# Patient Record
Sex: FEMALE | Race: WHITE | NOT HISPANIC OR LATINO | Employment: FULL TIME | URBAN - METROPOLITAN AREA
[De-identification: names, ages, dates, MRNs, and addresses within clinical notes are randomized per-mention and may not be internally consistent; named-entity substitution may affect disease eponyms.]

---

## 2018-06-14 ENCOUNTER — TRANSCRIBE ORDERS (OUTPATIENT)
Dept: DIABETES SERVICES | Facility: HOSPITAL | Age: 53
End: 2018-06-14

## 2018-06-14 DIAGNOSIS — E11.21 DIABETIC GLOMERULOPATHY (HCC): Primary | ICD-10-CM

## 2018-06-14 DIAGNOSIS — E11.8 UNCONTROLLED TYPE 2 DIABETES MELLITUS WITH COMPLICATION, UNSPECIFIED LONG TERM INSULIN USE STATUS: ICD-10-CM

## 2018-06-14 DIAGNOSIS — E11.65 UNCONTROLLED TYPE 2 DIABETES MELLITUS WITH COMPLICATION, UNSPECIFIED LONG TERM INSULIN USE STATUS: ICD-10-CM

## 2018-06-21 ENCOUNTER — HOSPITAL ENCOUNTER (OUTPATIENT)
Dept: DIABETES SERVICES | Facility: HOSPITAL | Age: 53
Setting detail: RECURRING SERIES
Discharge: HOME | End: 2018-06-21

## 2018-06-21 PROCEDURE — G0109 DIAB MANAGE TRN IND/GROUP: HCPCS | Performed by: DIETITIAN, REGISTERED

## 2019-05-10 ENCOUNTER — CONSULT (OUTPATIENT)
Dept: SLEEP MEDICINE | Facility: HOSPITAL | Age: 54
End: 2019-05-10

## 2019-05-10 VITALS
SYSTOLIC BLOOD PRESSURE: 120 MMHG | HEIGHT: 67 IN | OXYGEN SATURATION: 96 % | DIASTOLIC BLOOD PRESSURE: 85 MMHG | HEART RATE: 80 BPM | WEIGHT: 226.4 LBS | BODY MASS INDEX: 35.53 KG/M2

## 2019-05-10 DIAGNOSIS — R06.83 SNORING: Primary | ICD-10-CM

## 2019-05-10 DIAGNOSIS — E66.01 CLASS 2 SEVERE OBESITY DUE TO EXCESS CALORIES WITH SERIOUS COMORBIDITY AND BODY MASS INDEX (BMI) OF 35.0 TO 35.9 IN ADULT (HCC): ICD-10-CM

## 2019-05-10 DIAGNOSIS — G47.33 OBSTRUCTIVE SLEEP APNEA, ADULT: ICD-10-CM

## 2019-05-10 PROBLEM — E66.812 CLASS 2 SEVERE OBESITY DUE TO EXCESS CALORIES WITH SERIOUS COMORBIDITY AND BODY MASS INDEX (BMI) OF 35.0 TO 35.9 IN ADULT: Status: ACTIVE | Noted: 2019-05-10

## 2019-05-10 PROCEDURE — 99203 OFFICE O/P NEW LOW 30 MIN: CPT | Performed by: INTERNAL MEDICINE

## 2019-05-10 RX ORDER — CETIRIZINE HYDROCHLORIDE 5 MG/1
5 TABLET ORAL
COMMUNITY
Start: 2019-03-11 | End: 2022-06-17

## 2019-05-10 RX ORDER — GABAPENTIN 400 MG/1
CAPSULE ORAL
Refills: 0 | COMMUNITY
Start: 2019-05-01 | End: 2022-06-17

## 2019-05-10 RX ORDER — LOSARTAN POTASSIUM 50 MG/1
TABLET ORAL
COMMUNITY
Start: 2019-03-11 | End: 2021-04-13

## 2019-05-10 RX ORDER — LAMOTRIGINE 200 MG/1
TABLET ORAL
COMMUNITY
Start: 2019-03-11

## 2019-05-10 RX ORDER — LACTULOSE 10 G/15ML
SOLUTION ORAL
Refills: 0 | COMMUNITY
Start: 2019-03-01 | End: 2019-06-05

## 2019-05-10 RX ORDER — DULOXETIN HYDROCHLORIDE 60 MG/1
CAPSULE, DELAYED RELEASE ORAL
COMMUNITY
Start: 2019-04-04 | End: 2019-11-27

## 2019-05-10 RX ORDER — DOCUSATE SODIUM 250 MG/1
CAPSULE, LIQUID FILLED ORAL
Refills: 0 | COMMUNITY
Start: 2019-03-04 | End: 2019-06-05

## 2019-05-10 RX ORDER — DULOXETIN HYDROCHLORIDE 30 MG/1
CAPSULE, DELAYED RELEASE ORAL
COMMUNITY
Start: 2019-04-04 | End: 2019-11-27

## 2019-05-10 RX ORDER — OMEPRAZOLE 40 MG/1
CAPSULE, DELAYED RELEASE ORAL
COMMUNITY
Start: 2019-03-11

## 2019-05-10 RX ORDER — GLUCOSAM/CHON-MSM1/C/MANG/BOSW 500-416.6
TABLET ORAL 4 TIMES DAILY
Refills: 1 | COMMUNITY
Start: 2019-05-03

## 2019-05-10 RX ORDER — CLONIDINE HYDROCHLORIDE 0.1 MG/1
TABLET ORAL
Refills: 0 | COMMUNITY
Start: 2019-04-10

## 2019-05-10 RX ORDER — FLUTICASONE PROPIONATE 50 MCG
SPRAY, SUSPENSION (ML) NASAL
COMMUNITY
Start: 2019-02-18

## 2019-05-10 RX ORDER — RISPERIDONE 2 MG/1
TABLET ORAL
Refills: 0 | COMMUNITY
Start: 2019-03-28 | End: 2022-09-17

## 2019-05-10 RX ORDER — PRAVASTATIN SODIUM 40 MG
TABLET ORAL
COMMUNITY
Start: 2019-03-30

## 2019-05-10 RX ORDER — LEVOTHYROXINE SODIUM 0.03 MG/1
TABLET ORAL
COMMUNITY
Start: 2019-03-11

## 2019-05-10 RX ORDER — GLIPIZIDE 5 MG/1
TABLET, FILM COATED, EXTENDED RELEASE ORAL
Refills: 0 | COMMUNITY
Start: 2019-05-07 | End: 2022-01-30

## 2019-05-14 NOTE — PROGRESS NOTES
Subjective   Colette Hatch is a 53 y.o. female is being seen for consultation today at the request of ELVA Joy for the evaluation of snoring and nonrestorative sleep.    History of Present Illness  Patient complains of always feeling tired in the morning.  She says she often awakens between 4 and 6 AM.  She gets sleepy driving.  She has run on the [place] strips.  She has been told she snores loudly.  She is been noted to have snoring for 15 years.  She denies being told she has apneas.  She had a previous sleep study in was placed on CPAP but she could not leave it on.  She says she is gained probably 30 pounds since then.  She will fall asleep if sitting quietly during the day.  She naps almost daily for about 2 hours.    She has loud snoring snores in all positions.  She is awakened with a dry mouth.  She has trouble breathing through her nose during the day.  She denies ever breaking her nose.  She does not feel rested in the morning.  She has a morning headache 3 days/week.  She has a history of reflux and is on medications.  She is rarely experience hypnagogic hallucinations.  She denies any sleep paralysis or cataplexy.  She denies kicking or jerking her legs at night.  She occasionally has pain in her back.    She goes to bed about 10 PM.  She will fall asleep in about 15 minutes.  She awakens twice during the night.  She thinks she gets about 6 hours of sleep and says she usually feels tired on arising.  She has had hypertension known for 20 years.  She has had diabetes known for 2 years.  She is been hypothyroid for roughly 30 years.  She has been told she had schizoaffective disorder.  Allergies   Allergen Reactions   • Penicillins Rash   She is also allergic to pollen, animals, and dust.      Current Outpatient Medications:   •  Blood Glucose Monitoring Suppl (TRUE METRIX METER) w/Device kit, 4 (Four) Times a Day. as directed, Disp: , Rfl: 0  •  cetirizine (zyrTEC) 10 MG tablet, , Disp: , Rfl:    •  CloNIDine (CATAPRES) 0.1 MG tablet, take 1 tablet by mouth once daily ...MAY ADD A SECOND DOSE IF NEEDED FOR SYMPTOM MANAGEMENT, Disp: , Rfl: 0  •  DULoxetine (CYMBALTA) 30 MG capsule, , Disp: , Rfl:   •  DULoxetine (CYMBALTA) 60 MG capsule, , Disp: , Rfl:   •  fluticasone (FLONASE) 50 MCG/ACT nasal spray, , Disp: , Rfl:   •  gabapentin (NEURONTIN) 300 MG capsule, take 1 capsule by mouth four times a day IN THE MORNING, LUNCH, A...  (REFER TO PRESCRIPTION NOTES)., Disp: , Rfl: 0  •  glipiZIDE (GLUCOTROL XL) 2.5 MG 24 hr tablet, take 1 tablet by mouth once daily with LARGEST MEAL, Disp: , Rfl: 0  •  lactulose (CHRONULAC) 10 GM/15ML solution, take 15 milliliters by mouth once daily if needed for constipation may repeat in 2 hours, Disp: , Rfl: 0  •  lamoTRIgine (LaMICtal) 200 MG tablet, , Disp: , Rfl:   •  levothyroxine (SYNTHROID, LEVOTHROID) 25 MCG tablet, , Disp: , Rfl:   •  losartan (COZAAR) 50 MG tablet, , Disp: , Rfl:   •  metFORMIN (GLUCOPHAGE) 500 MG tablet, Take 500 mg by mouth 2 (Two) Times a Day With Meals., Disp: , Rfl: 0  •  omeprazole (priLOSEC) 40 MG capsule, , Disp: , Rfl:   •  pravastatin (PRAVACHOL) 40 MG tablet, , Disp: , Rfl:   •  RA COL-RITE 250 MG capsule, , Disp: , Rfl: 0  •  risperiDONE (risperDAL) 2 MG tablet, take 1/2 tablet by mouth four times a day, Disp: , Rfl: 0  •  TRUEPLUS LANCETS 33G misc, 4 (Four) Times a Day. as directed, Disp: , Rfl: 1    Social History    Tobacco Use      Smoking status: Former Smoker she estimates smoking a pack per day for 16 years.        Packs/day: 1.00        Types: Cigarettes        Quit date:         Years since quittin.3      Smokeless tobacco: Never Used       Social History     Substance and Sexual Activity   Alcohol Use No   • Frequency: Never       Caffeine: She has 3 cups of coffee and 2 servings of tea each day    Past Medical History:   Diagnosis Date   • Arthritis    • Diabetes mellitus (CMS/HCC)    • Disease of thyroid gland    •  "Hypertension    • Mental disorder        History reviewed. No pertinent surgical history.    Family History   Problem Relation Age of Onset   • Hypertension Mother    • Obesity Mother    • Diabetes Father    • Hypertension Father    • Obesity Father    • Obesity Sister    • Hypersomnolence Sister    • Obesity Brother    • Sleep apnea Brother    • Heart disease Maternal Aunt    • Narcolepsy Maternal Uncle        The following portions of the patient's history were reviewed and updated as appropriate: allergies, current medications, past family history, past medical history, past social history, past surgical history and problem list.    Review of Systems   Constitutional: Positive for fatigue.   HENT: Positive for tinnitus.    Eyes: Negative.    Respiratory: Negative.    Cardiovascular: Negative.    Gastrointestinal: Positive for constipation.        She complains of change in appetite   Endocrine: Positive for cold intolerance, heat intolerance, polydipsia and polyuria.   Genitourinary: Positive for frequency.   Musculoskeletal: Positive for arthralgias, joint swelling and myalgias.   Skin: Negative.    Allergic/Immunologic: Positive for environmental allergies.   Neurological: Positive for dizziness, light-headedness and numbness.   Hematological: Negative.    Psychiatric/Behavioral: Positive for confusion, decreased concentration and dysphoric mood. The patient is nervous/anxious.    [place] score is 21/24    Objective     /85   Pulse 80   Ht 170.2 cm (67\")   Wt 103 kg (226 lb 6.4 oz)   SpO2 96%   BMI 35.46 kg/m²      Physical Exam   Constitutional: She is oriented to person, place, and time. She appears well-developed and well-nourished.   She is obese.   HENT:   Head: Normocephalic and atraumatic.   She has Mallampati class IV anatomy.   Eyes: EOM are normal. Pupils are equal, round, and reactive to light.   Neck: Normal range of motion. Neck supple.   Cardiovascular: Normal rate, regular rhythm and " normal heart sounds.   Pulmonary/Chest: Effort normal and breath sounds normal.   Abdominal: Soft. Bowel sounds are normal.   Musculoskeletal: Normal range of motion. She exhibits no edema.   Neurological: She is alert and oriented to person, place, and time.   Skin: Skin is warm and dry.   Psychiatric: She has a normal mood and affect. Her behavior is normal.         Assessment/Plan   Colette was seen today for sleeping problem.    Diagnoses and all orders for this visit:    Snoring  -     Home Sleep Study; Future    Obstructive sleep apnea, adult  -     Home Sleep Study; Future    Class 2 severe obesity due to excess calories with serious comorbidity and body mass index (BMI) of 35.0 to 35.9 in adult (CMS/[facility])    Patient is a history of snoring nonrestorative sleep.  I think she has an excellent story for obstructive sleep apnea.  We will plan to proceed to home sleep testing.  I discussed possible therapies including CPAP, weight control, oral appliance, and surgery.  We have also discussed the long-term consequences of untreated obstructive sleep apnea.  She is to return in after study.  She is encouraged to lose weight.  She is encouraged to avoid alcohol and sedatives close to bedtime.  She is encouraged to practice lateral position sleep         Gaudencio Miller MD [facility]  Sleep Medicine  Pulmonary and Critical Care Medicine

## 2019-06-05 ENCOUNTER — HOSPITAL ENCOUNTER (OUTPATIENT)
Dept: SLEEP MEDICINE | Facility: HOSPITAL | Age: 54
Discharge: HOME | End: 2019-06-05
Admitting: INTERNAL MEDICINE

## 2019-06-05 VITALS
WEIGHT: 226.6 LBS | DIASTOLIC BLOOD PRESSURE: 85 MMHG | OXYGEN SATURATION: 96 % | TEMPERATURE: 98.6 F | HEIGHT: 67 IN | SYSTOLIC BLOOD PRESSURE: 157 MMHG | HEART RATE: 76 BPM | BODY MASS INDEX: 35.56 KG/M2 | RESPIRATION RATE: 18 BRPM

## 2019-06-05 DIAGNOSIS — G47.33 OBSTRUCTIVE SLEEP APNEA, ADULT: ICD-10-CM

## 2019-06-05 DIAGNOSIS — R06.83 SNORING: ICD-10-CM

## 2019-06-05 PROCEDURE — 95806 SLEEP STUDY UNATT&RESP EFFT: CPT | Performed by: INTERNAL MEDICINE

## 2019-06-05 PROCEDURE — 95806 SLEEP STUDY UNATT&RESP EFFT: CPT

## 2019-06-06 DIAGNOSIS — R06.83 SNORING: ICD-10-CM

## 2019-06-06 DIAGNOSIS — E66.01 CLASS 2 SEVERE OBESITY DUE TO EXCESS CALORIES WITH SERIOUS COMORBIDITY AND BODY MASS INDEX (BMI) OF 35.0 TO 35.9 IN ADULT (HCC): ICD-10-CM

## 2019-06-06 DIAGNOSIS — G47.33 OBSTRUCTIVE SLEEP APNEA, ADULT: Primary | ICD-10-CM

## 2019-06-13 ENCOUNTER — OFFICE VISIT (OUTPATIENT)
Dept: SLEEP MEDICINE | Facility: HOSPITAL | Age: 54
End: 2019-06-13

## 2019-06-13 VITALS
WEIGHT: 226.4 LBS | HEART RATE: 76 BPM | BODY MASS INDEX: 35.53 KG/M2 | DIASTOLIC BLOOD PRESSURE: 80 MMHG | SYSTOLIC BLOOD PRESSURE: 133 MMHG | OXYGEN SATURATION: 96 % | HEIGHT: 67 IN

## 2019-06-13 DIAGNOSIS — G47.33 OSA (OBSTRUCTIVE SLEEP APNEA): Primary | ICD-10-CM

## 2019-06-13 DIAGNOSIS — E66.01 CLASS 2 SEVERE OBESITY DUE TO EXCESS CALORIES WITH SERIOUS COMORBIDITY AND BODY MASS INDEX (BMI) OF 35.0 TO 35.9 IN ADULT (HCC): ICD-10-CM

## 2019-06-13 PROCEDURE — 99213 OFFICE O/P EST LOW 20 MIN: CPT | Performed by: NURSE PRACTITIONER

## 2019-06-13 NOTE — PROGRESS NOTES
Subjective: Follow-up        Chief Complaint:   Chief Complaint   Patient presents with   • Follow-up       HPI:    Colette Hatch is a 53 y.o. female here for follow-up of study results.  Patient was seen here in consult 5/10/2019 by Dr. Gaudencio Miller.  Patient is having excessive daytime sleepiness, drowsy driving, and snoring.  Patient in the past was diagnosed with sleep apnea but only wore CPAP for approximately 2 months.  Patient states this was uncomfortable and did complain of leaks.  Patient had a sleep study on 6/5/2019 that did show mild obstructive sleep apnea.  At this time she is sleeping 6 hours nightly and does not feel refreshed upon awakening.  Patient has an [place] score of 21/24.  Patient understands consequences of untreated sleep apnea as well as therapies available to her.  Patient wishes to initiate CPAP therapy.        Current medications are:   Current Outpatient Medications:   •  Blood Glucose Monitoring Suppl (TRUE METRIX METER) w/Device kit, 4 (Four) Times a Day. as directed, Disp: , Rfl: 0  •  cetirizine (zyrTEC) 5 MG tablet, 5 mg., Disp: , Rfl:   •  CloNIDine (CATAPRES) 0.1 MG tablet, take 1 tablet by mouth once daily ...MAY ADD A SECOND DOSE IF NEEDED FOR SYMPTOM MANAGEMENT, Disp: , Rfl: 0  •  DULoxetine (CYMBALTA) 30 MG capsule, , Disp: , Rfl:   •  DULoxetine (CYMBALTA) 60 MG capsule, , Disp: , Rfl:   •  fluticasone (FLONASE) 50 MCG/ACT nasal spray, , Disp: , Rfl:   •  gabapentin (NEURONTIN) 300 MG capsule, take 1 capsule by mouth four times a day IN THE MORNING, LUNCH, A...  (REFER TO PRESCRIPTION NOTES)., Disp: , Rfl: 0  •  glipiZIDE (GLUCOTROL XL) 5 MG ER tablet, take 1 tablet by mouth once daily with LARGEST MEAL, Disp: , Rfl: 0  •  lamoTRIgine (LaMICtal) 200 MG tablet, , Disp: , Rfl:   •  levothyroxine (SYNTHROID, LEVOTHROID) 25 MCG tablet, , Disp: , Rfl:   •  losartan (COZAAR) 50 MG tablet, , Disp: , Rfl:   •  metFORMIN (GLUCOPHAGE) 500 MG tablet, Take 500 mg by mouth 2 (Two) Times  a Day With Meals., Disp: , Rfl: 0  •  omeprazole (priLOSEC) 40 MG capsule, , Disp: , Rfl:   •  pravastatin (PRAVACHOL) 40 MG tablet, , Disp: , Rfl:   •  risperiDONE (risperDAL) 2 MG tablet, take 1/2 tablet by mouth four times a day, Disp: , Rfl: 0  •  TRUEPLUS LANCETS 33G misc, 4 (Four) Times a Day. as directed, Disp: , Rfl: 1.      The patient's relevant past medical, surgical, family and social history were reviewed and updated in Epic as appropriate.       Review of Systems   Constitutional: Positive for fatigue.   HENT: Positive for tinnitus.    Eyes: Positive for visual disturbance.   Respiratory: Positive for apnea.    Gastrointestinal: Positive for constipation.   Endocrine: Positive for cold intolerance, heat intolerance, polydipsia and polyuria.   Genitourinary: Positive for frequency.   Musculoskeletal: Positive for arthralgias and myalgias.   Allergic/Immunologic: Positive for environmental allergies.   Neurological: Positive for dizziness, light-headedness and numbness.   Psychiatric/Behavioral: Positive for confusion, decreased concentration, dysphoric mood and sleep disturbance. The patient is nervous/anxious.    All other systems reviewed and are negative.        Objective:    Physical Exam   Constitutional: She is oriented to person, place, and time. She appears well-developed and well-nourished.   HENT:   Head: Normocephalic and atraumatic.   Mouth/Throat: Oropharynx is clear and moist.   Class 4 airway   Eyes: Conjunctivae are normal.   Neck: Neck supple. No thyromegaly present.   Cardiovascular: Normal rate and regular rhythm.   Pulmonary/Chest: Effort normal and breath sounds normal.   Lymphadenopathy:     She has no cervical adenopathy.   Neurological: She is alert and oriented to person, place, and time.   Skin: Skin is warm and dry.   Psychiatric: She has a normal mood and affect. Her behavior is normal. Judgment and thought content normal.   Nursing note and vitals  reviewed.        ASSESSMENT/PLAN    Colette was seen today for follow-up.    Diagnoses and all orders for this visit:    FREDA (obstructive sleep apnea)    Class 2 severe obesity due to excess calories with serious comorbidity and body mass index (BMI) of 35.0 to 35.9 in adult (CMS/[facility])            1. Counseled patient regarding multimodal approach with healthy nutrition, healthy sleep, regular physical activity, social activities, counseling, and medications. Encouraged to practice lateral sleep position. Avoid alcohol and sedatives close to bedtime.  2.   Order to initiate CPAP therapy faxed to DME of patient's choosing.  I will see patient back in 31 to 90 days.  I have reviewed the results of my evaluation and impression and discussed my recommendations in detail with the patient.      Signed by  ELVA Benítez    June 13, 2019      CC: Merle Charlton APRN          No ref. provider found

## 2019-06-13 NOTE — PATIENT INSTRUCTIONS

## 2019-08-15 ENCOUNTER — OFFICE VISIT (OUTPATIENT)
Dept: SLEEP MEDICINE | Facility: HOSPITAL | Age: 54
End: 2019-08-15

## 2019-08-15 VITALS
TEMPERATURE: 97.1 F | RESPIRATION RATE: 18 BRPM | BODY MASS INDEX: 35 KG/M2 | SYSTOLIC BLOOD PRESSURE: 109 MMHG | WEIGHT: 223 LBS | HEIGHT: 67 IN | OXYGEN SATURATION: 96 % | DIASTOLIC BLOOD PRESSURE: 65 MMHG | HEART RATE: 77 BPM

## 2019-08-15 DIAGNOSIS — G47.33 OSA (OBSTRUCTIVE SLEEP APNEA): Primary | ICD-10-CM

## 2019-08-15 PROCEDURE — 99212 OFFICE O/P EST SF 10 MIN: CPT | Performed by: NURSE PRACTITIONER

## 2019-08-15 NOTE — PROGRESS NOTES
Chief Complaint:   Chief Complaint   Patient presents with   • Follow-up       HPI:  *    Colette Hatch is a 53 y.o. female here for compliance after starting CPAP. Patient was seen here in consult 5/10/2019 by Dr. Gaudencio Miller.  Patient was having excessive daytime sleepiness, drowsy driving, and snoring.  Patient in the past was diagnosed with sleep apnea but only wore CPAP for approximately 2 months.  Patient states this was uncomfortable and did complain of leaks.  Patient had a sleep study on 6/5/2019 that did show mild obstructive sleep apnea.  Patient decided to initiate CPAP therapy on her last appointment of 6/13/2019.  Vision states when she wears her CPAP she feels she does really well and feels rested upon awakening and throughout the day.  Patient has an [place] score of 4/24.  Patient states her main problem has been laying down in the bed before placing on her mask and she does fall asleep before she gets a chance to do this.  Patient has also been staying up late with her  and does know she needs to have a better nighttime routine.  She uses a nasal mask due to her preference but does still experience some leaks.  Patient would like to continue CPAP to see if she can become more compliant.             Current medications are:   Current Outpatient Medications:   •  Blood Glucose Monitoring Suppl (TRUE METRIX METER) w/Device kit, 4 (Four) Times a Day. as directed, Disp: , Rfl: 0  •  cetirizine (zyrTEC) 5 MG tablet, 5 mg., Disp: , Rfl:   •  CloNIDine (CATAPRES) 0.1 MG tablet, take 1 tablet by mouth once daily ...MAY ADD A SECOND DOSE IF NEEDED FOR SYMPTOM MANAGEMENT, Disp: , Rfl: 0  •  DULoxetine (CYMBALTA) 30 MG capsule, , Disp: , Rfl:   •  DULoxetine (CYMBALTA) 60 MG capsule, , Disp: , Rfl:   •  fluticasone (FLONASE) 50 MCG/ACT nasal spray, , Disp: , Rfl:   •  gabapentin (NEURONTIN) 300 MG capsule, take 1 capsule by mouth four times a day IN THE MORNING, LUNCH, A...  (REFER TO PRESCRIPTION  NOTES)., Disp: , Rfl: 0  •  glipiZIDE (GLUCOTROL XL) 5 MG ER tablet, take 1 tablet by mouth once daily with LARGEST MEAL, Disp: , Rfl: 0  •  lamoTRIgine (LaMICtal) 200 MG tablet, , Disp: , Rfl:   •  levothyroxine (SYNTHROID, LEVOTHROID) 25 MCG tablet, , Disp: , Rfl:   •  losartan (COZAAR) 50 MG tablet, , Disp: , Rfl:   •  metFORMIN (GLUCOPHAGE) 500 MG tablet, Take 500 mg by mouth 2 (Two) Times a Day With Meals., Disp: , Rfl: 0  •  omeprazole (priLOSEC) 40 MG capsule, , Disp: , Rfl:   •  pravastatin (PRAVACHOL) 40 MG tablet, , Disp: , Rfl:   •  risperiDONE (risperDAL) 2 MG tablet, take 1/2 tablet by mouth four times a day, Disp: , Rfl: 0  •  SITagliptin (JANUVIA) 50 MG tablet, Take 50 mg by mouth Daily., Disp: , Rfl:   •  TRUEPLUS LANCETS 33G misc, 4 (Four) Times a Day. as directed, Disp: , Rfl: 1.      The patient's relevant past medical, surgical, family and social history were reviewed and updated in Epic as appropriate.       Review of Systems   Eyes: Positive for visual disturbance.   Respiratory: Positive for apnea.    Gastrointestinal: Positive for constipation.   Endocrine: Positive for cold intolerance, heat intolerance, polydipsia and polyuria.   Musculoskeletal: Positive for arthralgias and myalgias.   Neurological: Positive for dizziness, light-headedness and numbness.   Psychiatric/Behavioral: Positive for confusion, decreased concentration, dysphoric mood and sleep disturbance. The patient is nervous/anxious.    All other systems reviewed and are negative.        Objective:    Physical Exam   Constitutional: She is oriented to person, place, and time. She appears well-developed and well-nourished.   HENT:   Head: Normocephalic and atraumatic.   Mouth/Throat: Oropharynx is clear and moist.   Class 4 airway   Eyes: Conjunctivae are normal.   Neck: Neck supple. No thyromegaly present.   Cardiovascular: Normal rate and regular rhythm.   Pulmonary/Chest: Effort normal and breath sounds normal.    Lymphadenopathy:     She has no cervical adenopathy.   Neurological: She is alert and oriented to person, place, and time.   Psychiatric: She has a normal mood and affect. Her behavior is normal. Judgment and thought content normal.   Nursing note and vitals reviewed.    46/55 days of use, > 4 hour use 29.1. 90 percent pressure 9.5.  AHI 4.2.  Download reviewed with patient.    ASSESSMENT/PLAN    Colette was seen today for follow-up.    Diagnoses and all orders for this visit:    FREDA (obstructive sleep apnea)  -     PAP Therapy            1. Counseled patient regarding multimodal approach with healthy nutrition, healthy sleep, regular physical activity, social activities, counseling, and medications. Encouraged to practice lateral  sleep position. Avoid alcohol and sedatives close to bedtime.  2.   Refill supplies x1 year.  Patient encouraged to increase compliance.  See patient back in 3 months to reassess.  I have reviewed the results of my evaluation and impression and discussed my recommendations in detail with the patient.      Signed by  ELVA Benítez    August 15, 2019      CC: Merle Charlton APRN          No ref. provider found

## 2019-11-27 ENCOUNTER — OFFICE VISIT (OUTPATIENT)
Dept: SLEEP MEDICINE | Facility: HOSPITAL | Age: 54
End: 2019-11-27

## 2019-11-27 VITALS
BODY MASS INDEX: 36.41 KG/M2 | HEART RATE: 66 BPM | OXYGEN SATURATION: 97 % | WEIGHT: 232 LBS | HEIGHT: 67 IN | SYSTOLIC BLOOD PRESSURE: 119 MMHG | DIASTOLIC BLOOD PRESSURE: 71 MMHG

## 2019-11-27 DIAGNOSIS — G47.33 OSA (OBSTRUCTIVE SLEEP APNEA): Primary | ICD-10-CM

## 2019-11-27 PROCEDURE — 99212 OFFICE O/P EST SF 10 MIN: CPT | Performed by: NURSE PRACTITIONER

## 2019-11-27 RX ORDER — FLUOXETINE HYDROCHLORIDE 40 MG/1
CAPSULE ORAL
Refills: 0 | COMMUNITY
Start: 2019-10-12 | End: 2021-05-11

## 2019-11-27 RX ORDER — FLUOXETINE HYDROCHLORIDE 20 MG/1
CAPSULE ORAL
Refills: 0 | COMMUNITY
Start: 2019-11-04 | End: 2021-05-11

## 2019-11-27 NOTE — PATIENT INSTRUCTIONS
Sleep Apnea  Sleep apnea affects breathing during sleep. It causes breathing to stop for a short time or to become shallow. It can also increase the risk of:  · Heart attack.  · Stroke.  · Being very overweight (obese).  · Diabetes.  · Heart failure.  · Irregular heartbeat.  The goal of treatment is to help you breathe normally again.  What are the causes?  There are three kinds of sleep apnea:  · Obstructive sleep apnea. This is caused by a blocked or collapsed airway.  · Central sleep apnea. This happens when the brain does not send the right signals to the muscles that control breathing.  · Mixed sleep apnea. This is a combination of obstructive and central sleep apnea.  The most common cause of this condition is a collapsed or blocked airway. This can happen if:  · Your throat muscles are too relaxed.  · Your tongue and tonsils are too large.  · You are overweight.  · Your airway is too small.  What increases the risk?  · Being overweight.  · Smoking.  · Having a small airway.  · Being older.  · Being male.  · Drinking alcohol.  · Taking medicines to calm yourself (sedatives or tranquilizers).  · Having family members with the condition.  What are the signs or symptoms?  · Trouble staying asleep.  · Being sleepy or tired during the day.  · Getting angry a lot.  · Loud snoring.  · Headaches in the morning.  · Not being able to focus your mind (concentrate).  · Forgetting things.  · Less interest in sex.  · Mood swings.  · Personality changes.  · Feelings of sadness (depression).  · Waking up a lot during the night to pee (urinate).  · Dry mouth.  · Sore throat.  How is this diagnosed?  · Your medical history.  · A physical exam.  · A test that is done when you are sleeping (sleep study). The test is most often done in a sleep lab but may also be done at home.  How is this treated?    · Sleeping on your side.  · Using a medicine to get rid of mucus in your nose (decongestant).  · Avoiding the use of alcohol,  medicines to help you relax, or certain pain medicines (narcotics).  · Losing weight, if needed.  · Changing your diet.  · Not smoking.  · Using a machine to open your airway while you sleep, such as:  ? An oral appliance. This is a mouthpiece that shifts your lower jaw forward.  ? A CPAP device. This device blows air through a mask when you breathe out (exhale).  ? An EPAP device. This has valves that you put in each nostril.  ? A BPAP device. This device blows air through a mask when you breathe in (inhale) and breathe out.  · Having surgery if other treatments do not work.  It is important to get treatment for sleep apnea. Without treatment, it can lead to:  · High blood pressure.  · Coronary artery disease.  · In men, not being able to have an erection (impotence).  · Reduced thinking ability.  Follow these instructions at home:  Lifestyle  · Make changes that your doctor recommends.  · Eat a healthy diet.  · Lose weight if needed.  · Avoid alcohol, medicines to help you relax, and some pain medicines.  · Do not use any products that contain nicotine or tobacco, such as cigarettes, e-cigarettes, and chewing tobacco. If you need help quitting, ask your doctor.  General instructions  · Take over-the-counter and prescription medicines only as told by your doctor.  · If you were given a machine to use while you sleep, use it only as told by your doctor.  · If you are having surgery, make sure to tell your doctor you have sleep apnea. You may need to bring your device with you.  · Keep all follow-up visits as told by your doctor. This is important.  Contact a doctor if:  · The machine that you were given to use during sleep bothers you or does not seem to be working.  · You do not get better.  · You get worse.  Get help right away if:  · Your chest hurts.  · You have trouble breathing in enough air.  · You have an uncomfortable feeling in your back, arms, or stomach.  · You have trouble talking.  · One side of your  body feels weak.  · A part of your face is hanging down.  These symptoms may be an emergency. Do not wait to see if the symptoms will go away. Get medical help right away. Call your local emergency services (911 in the [place] Do not drive yourself to the hospital.  Summary  · This condition affects breathing during sleep.  · The most common cause is a collapsed or blocked airway.  · The goal of treatment is to help you breathe normally while you sleep.  This information is not intended to replace advice given to you by your health care provider. Make sure you discuss any questions you have with your health care provider.  Document Released: 09/26/2009 Document Revised: 08/13/2019 Document Reviewed: 08/13/2019  [employer] Interactive Patient Education © 2019 [employer]

## 2019-11-27 NOTE — PROGRESS NOTES
Chief Complaint:   Chief Complaint   Patient presents with   • Follow-up       HPI:    Colette Hatch is a 54 y.o. female here for follow-up of sleep apnea.  Patient was last seen 8/15/2019.  Patient does use CPAP for mild obstructive sleep apnea.  On last appointment patient was not compliant as she with fall asleep most nights without a 15 the mask on.  Patient has been getting better with that but after going to the restroom in the middle the night she does not reapply.  Patient is sleeping 6 hours nightly and when she wears CPAP all night she does feel refreshed upon awakening.  Patient has an [place] score of 9/24.  Patient continues to try and be compliant.  We discussed today applying the mask before sitting on the bed at any time of the beginning of the night or in the middle after using the restroom.  She states she will try this to see if it will help.        Current medications are:   Current Outpatient Medications:   •  Blood Glucose Monitoring Suppl (TRUE METRIX METER) w/Device kit, 4 (Four) Times a Day. as directed, Disp: , Rfl: 0  •  cetirizine (zyrTEC) 5 MG tablet, 5 mg., Disp: , Rfl:   •  CloNIDine (CATAPRES) 0.1 MG tablet, take 1 tablet by mouth once daily ...MAY ADD A SECOND DOSE IF NEEDED FOR SYMPTOM MANAGEMENT, Disp: , Rfl: 0  •  FLUoxetine (PROzac) 20 MG capsule, , Disp: , Rfl: 0  •  FLUoxetine (PROzac) 40 MG capsule, , Disp: , Rfl: 0  •  fluticasone (FLONASE) 50 MCG/ACT nasal spray, , Disp: , Rfl:   •  gabapentin (NEURONTIN) 400 MG capsule, take 1 capsule by mouth two times a day IN THE MORNING, LUNCH, A...  (REFER TO PRESCRIPTION NOTES)., Disp: , Rfl: 0  •  glipiZIDE (GLUCOTROL XL) 5 MG ER tablet, take 1 tablet by mouth once daily with LARGEST MEAL, Disp: , Rfl: 0  •  lamoTRIgine (LaMICtal) 200 MG tablet, , Disp: , Rfl:   •  levothyroxine (SYNTHROID, LEVOTHROID) 25 MCG tablet, , Disp: , Rfl:   •  losartan (COZAAR) 50 MG tablet, , Disp: , Rfl:   •  metFORMIN (GLUCOPHAGE) 500 MG tablet, Take  500 mg by mouth 2 (Two) Times a Day With Meals., Disp: , Rfl: 0  •  omeprazole (priLOSEC) 40 MG capsule, , Disp: , Rfl:   •  pravastatin (PRAVACHOL) 40 MG tablet, , Disp: , Rfl:   •  risperiDONE (risperDAL) 2 MG tablet, take 1/2 tablet by mouth four times a day, Disp: , Rfl: 0  •  SITagliptin (JANUVIA) 50 MG tablet, Take 50 mg by mouth Daily., Disp: , Rfl:   •  TRUEPLUS LANCETS 33G misc, 4 (Four) Times a Day. as directed, Disp: , Rfl: 1.      The patient's relevant past medical, surgical, family and social history were reviewed and updated in Epic as appropriate.       Review of Systems   Eyes: Positive for visual disturbance.   Respiratory: Positive for apnea.    Gastrointestinal: Positive for constipation.   Endocrine: Positive for cold intolerance, heat intolerance, polydipsia and polyuria.   Musculoskeletal: Positive for arthralgias, joint swelling and myalgias.   Neurological: Positive for dizziness, light-headedness and numbness.   Psychiatric/Behavioral: Positive for confusion, decreased concentration, dysphoric mood and sleep disturbance. The patient is nervous/anxious.    All other systems reviewed and are negative.        Objective:    Physical Exam   Constitutional: She is oriented to person, place, and time. She appears well-developed and well-nourished.   HENT:   Head: Normocephalic and atraumatic.   Mouth/Throat: Oropharynx is clear and moist.   Mallampati 4 anatomy   Eyes: Conjunctivae are normal.   Neck: Neck supple. No thyromegaly present.   Cardiovascular: Normal rate and regular rhythm.   Pulmonary/Chest: Effort normal and breath sounds normal.   Lymphadenopathy:     She has no cervical adenopathy.   Neurological: She is alert and oriented to person, place, and time.   Skin: Skin is warm and dry.   Psychiatric: She has a normal mood and affect. Her behavior is normal. Judgment and thought content normal.   Nursing note and vitals reviewed.  62/77 days of use.  Greater than 4-hour use 44.2%.  90%  pressure 10.2.  AHI 3.6.  Download reviewed with patient.      ASSESSMENT/PLAN    Colette was seen today for follow-up.    Diagnoses and all orders for this visit:    FREDA (obstructive sleep apnea)  -     CPAP Therapy            1. Counseled patient regarding multimodal approach with healthy nutrition, healthy sleep, regular physical activity, social activities, counseling, and medications. Encouraged to practice lateral  sleep position. Avoid alcohol and sedatives close to bedtime.  2. Refill supplies x1 year.  Return to clinic 1 year sooner symptoms warrant.  3. Patient does understand she needs to increase use to meet her compliance.    I have reviewed the results of my evaluation and impression and discussed my recommendations in detail with the patient.      Signed by  ELVA Benítez    November 27, 2019      CC: Merle Charlton, ELVA          No ref. provider found

## 2020-12-12 PROCEDURE — U0004 COV-19 TEST NON-CDC HGH THRU: HCPCS | Performed by: NURSE PRACTITIONER

## 2020-12-12 PROCEDURE — 87086 URINE CULTURE/COLONY COUNT: CPT | Performed by: NURSE PRACTITIONER

## 2020-12-14 ENCOUNTER — TELEPHONE (OUTPATIENT)
Dept: URGENT CARE | Facility: CLINIC | Age: 55
End: 2020-12-14

## 2020-12-14 NOTE — TELEPHONE ENCOUNTER
Spoke with Pt informed lab result was not detected. Informed Pt to quarantine for 10 days from onset of symptoms. Pt verbalized understanding no further questions

## 2021-01-14 ENCOUNTER — TELEMEDICINE (OUTPATIENT)
Dept: SLEEP MEDICINE | Facility: HOSPITAL | Age: 56
End: 2021-01-14

## 2021-01-14 VITALS — WEIGHT: 225 LBS | BODY MASS INDEX: 35.31 KG/M2 | HEIGHT: 67 IN

## 2021-01-14 DIAGNOSIS — G47.33 OBSTRUCTIVE SLEEP APNEA, ADULT: Primary | ICD-10-CM

## 2021-01-14 PROCEDURE — 99212 OFFICE O/P EST SF 10 MIN: CPT | Performed by: NURSE PRACTITIONER

## 2021-01-14 NOTE — PROGRESS NOTES
"    Chief Complaint:   Chief Complaint   Patient presents with   • Follow-up       HPI:    Colette Hatch is a 55 y.o. female here for follow-up of sleep apnea.  Patient was last seen 11/27/2019.  When patient uses her machine for at least 6 hours she does feel rested.  She has not been compliant as of late due to a \"whistling by my nose.\"  When she does not wear her machine she sleeps 7 to 9 hours nightly and still does not feel refreshed.  She has an [place] score of 11/24.  I have talked to patient about when the last time she changed her nasal pillows and states is been close to a year.  Have advised her to get new supplies changed to nasal pillows to see if she can get a better fit without the air leak.  She does understand her 90% pressure is 10.2 with an AHI of 6.9.  After her leak is fixed we may need to adjust her settings.  We will move forward with this plan of care.        Current medications are:   Current Outpatient Medications:   •  Blood Glucose Monitoring Suppl (TRUE METRIX METER) w/Device kit, 4 (Four) Times a Day. as directed, Disp: , Rfl: 0  •  cetirizine (zyrTEC) 5 MG tablet, 5 mg., Disp: , Rfl:   •  CloNIDine (CATAPRES) 0.1 MG tablet, take 1 tablet by mouth once daily ...MAY ADD A SECOND DOSE IF NEEDED FOR SYMPTOM MANAGEMENT, Disp: , Rfl: 0  •  FLUoxetine (PROzac) 20 MG capsule, , Disp: , Rfl: 0  •  FLUoxetine (PROzac) 40 MG capsule, , Disp: , Rfl: 0  •  fluticasone (FLONASE) 50 MCG/ACT nasal spray, , Disp: , Rfl:   •  gabapentin (NEURONTIN) 400 MG capsule, take 1 capsule by mouth two times a day IN THE MORNING, LUNCH, A...  (REFER TO PRESCRIPTION NOTES)., Disp: , Rfl: 0  •  glipiZIDE (GLUCOTROL XL) 5 MG ER tablet, take 1 tablet by mouth once daily with LARGEST MEAL, Disp: , Rfl: 0  •  lamoTRIgine (LaMICtal) 200 MG tablet, , Disp: , Rfl:   •  levothyroxine (SYNTHROID, LEVOTHROID) 25 MCG tablet, , Disp: , Rfl:   •  losartan (COZAAR) 50 MG tablet, , Disp: , Rfl:   •  metFORMIN (GLUCOPHAGE) 500 MG " tablet, Take 500 mg by mouth 2 (Two) Times a Day With Meals., Disp: , Rfl: 0  •  omeprazole (priLOSEC) 40 MG capsule, , Disp: , Rfl:   •  pravastatin (PRAVACHOL) 40 MG tablet, , Disp: , Rfl:   •  risperiDONE (risperDAL) 2 MG tablet, take 1/2 tablet by mouth four times a day, Disp: , Rfl: 0  •  SITagliptin (JANUVIA) 50 MG tablet, Take 50 mg by mouth Daily., Disp: , Rfl:   •  TRUEPLUS LANCETS 33G misc, 4 (Four) Times a Day. as directed, Disp: , Rfl: 1.      The patient's relevant past medical, surgical, family and social history were reviewed and updated in Epic as appropriate.       Review of Systems   Eyes: Positive for visual disturbance.   Respiratory: Positive for apnea.    Gastrointestinal: Positive for constipation.   Endocrine: Positive for cold intolerance, heat intolerance, polydipsia and polyuria.   Musculoskeletal: Positive for arthralgias, joint swelling and myalgias.   Neurological: Positive for dizziness, light-headedness and numbness.   Psychiatric/Behavioral: Positive for confusion, decreased concentration, dysphoric mood and sleep disturbance. The patient is nervous/anxious.    All other systems reviewed and are negative.        Objective:    Physical Exam  Constitutional:       Appearance: Normal appearance.   HENT:      Head: Normocephalic and atraumatic.      Mouth/Throat:      Comments: Class 4 airway  Pulmonary:      Effort: Pulmonary effort is normal. No respiratory distress.   Skin:     Coloration: Skin is not jaundiced or pale.      Findings: No erythema.   Neurological:      Mental Status: She is alert and oriented to person, place, and time.   Psychiatric:         Mood and Affect: Mood normal.         Behavior: Behavior normal.         Thought Content: Thought content normal.         Judgment: Judgment normal.     18/30 days of use  Greater than 4-hour use 23.3  90% pressure 10.2  AHI of 6.9  Settings 818      ASSESSMENT/PLAN    Diagnoses and all orders for this visit:    1. Obstructive  sleep apnea, adult (Primary)  -     CPAP Therapy            1. Counseled patient regarding multimodal approach with healthy nutrition, healthy sleep, regular physical activity, social activities, counseling, and medications. Encouraged to practice lateral sleep position. Avoid alcohol and sedatives close to bedtime.  2.   Refill supplies x1 year.  Patient encouraged to be compliant and go to DME at this time to have her supplies replaced.  I will see her back in 6 weeks to reassess  Patient gave verbal consent for video visit.  I have reviewed the results of my evaluation and impression and discussed my recommendations in detail with the patient.      Signed by  ELVA Benítez    January 14, 2021      CC: Merle Charlton APRN          No ref. provider found

## 2021-03-02 ENCOUNTER — OFFICE VISIT (OUTPATIENT)
Dept: SLEEP MEDICINE | Facility: HOSPITAL | Age: 56
End: 2021-03-02

## 2021-03-02 VITALS
SYSTOLIC BLOOD PRESSURE: 163 MMHG | OXYGEN SATURATION: 97 % | DIASTOLIC BLOOD PRESSURE: 89 MMHG | HEIGHT: 55 IN | HEART RATE: 76 BPM | WEIGHT: 232.2 LBS | BODY MASS INDEX: 53.73 KG/M2

## 2021-03-02 DIAGNOSIS — G47.33 OBSTRUCTIVE SLEEP APNEA, ADULT: Primary | ICD-10-CM

## 2021-03-02 PROCEDURE — 99212 OFFICE O/P EST SF 10 MIN: CPT | Performed by: NURSE PRACTITIONER

## 2021-03-02 RX ORDER — DULOXETIN HYDROCHLORIDE 60 MG/1
60 CAPSULE, DELAYED RELEASE ORAL 2 TIMES DAILY
COMMUNITY
End: 2022-06-17

## 2021-03-02 NOTE — PROGRESS NOTES
Chief Complaint:   Chief Complaint   Patient presents with   • Follow-up       HPI:    Colette Hatch is a 55 y.o. female here for follow-up of sleep apnea.  Patient was last seen 1/14/2021.  Patient did have low use of CPAP therapy and was having difficulty with mask leak.  Patient states she had not changed any of her supplies in over 1 year.  We did discuss on her last visit she needed to get new supplies and change them immediately.  Patient presents today for reassessment.  Patient still has a greater than 4-hour use at 46.7 and increased AHI of 7.8.  Patient has yet to change any supplies and does not remember me telling her to do so.  I have urged her to call her DME immediately to get a new mask and this will help with a good seal.  Patient is sleeping anywhere from 5 to 9 hours nightly and does feel refreshed when she can wear her mask.  She will go to sleep within 15 minutes and does get up x1 in the night to use the restroom without difficulty going back to sleep.  Her [place] score is 6/24.  We did discuss today her 90% pressure is 11.7 and AHI is 7.8 I will be increasing her pressures to 10-20.        Current medications are:   Current Outpatient Medications:   •  Blood Glucose Monitoring Suppl (TRUE METRIX METER) w/Device kit, 4 (Four) Times a Day. as directed, Disp: , Rfl: 0  •  cetirizine (zyrTEC) 5 MG tablet, 5 mg., Disp: , Rfl:   •  CloNIDine (CATAPRES) 0.1 MG tablet, take 1 tablet by mouth once daily ...MAY ADD A SECOND DOSE IF NEEDED FOR SYMPTOM MANAGEMENT, Disp: , Rfl: 0  •  DULoxetine (CYMBALTA) 60 MG capsule, Take 60 mg by mouth 2 (Two) Times a Day., Disp: , Rfl:   •  fluticasone (FLONASE) 50 MCG/ACT nasal spray, , Disp: , Rfl:   •  gabapentin (NEURONTIN) 400 MG capsule, take 1 capsule by mouth two times a day IN THE MORNING, LUNCH, A...  (REFER TO PRESCRIPTION NOTES)., Disp: , Rfl: 0  •  lamoTRIgine (LaMICtal) 200 MG tablet, , Disp: , Rfl:   •  levothyroxine (SYNTHROID, LEVOTHROID) 25 MCG  tablet, , Disp: , Rfl:   •  losartan (COZAAR) 50 MG tablet, , Disp: , Rfl:   •  metFORMIN (GLUCOPHAGE) 500 MG tablet, Take 500 mg by mouth 2 (Two) Times a Day With Meals., Disp: , Rfl: 0  •  omeprazole (priLOSEC) 40 MG capsule, , Disp: , Rfl:   •  pravastatin (PRAVACHOL) 40 MG tablet, , Disp: , Rfl:   •  risperiDONE (risperDAL) 2 MG tablet, take 1/2 tablet by mouth four times a day, Disp: , Rfl: 0  •  SITagliptin (JANUVIA) 50 MG tablet, Take 50 mg by mouth Daily., Disp: , Rfl:   •  TRUEPLUS LANCETS 33G misc, 4 (Four) Times a Day. as directed, Disp: , Rfl: 1  •  FLUoxetine (PROzac) 20 MG capsule, , Disp: , Rfl: 0  •  FLUoxetine (PROzac) 40 MG capsule, , Disp: , Rfl: 0  •  glipiZIDE (GLUCOTROL XL) 5 MG ER tablet, take 1 tablet by mouth once daily with LARGEST MEAL, Disp: , Rfl: 0.      The patient's relevant past medical, surgical, family and social history were reviewed and updated in Epic as appropriate.       Review of Systems   Eyes: Positive for visual disturbance.   Respiratory: Positive for apnea.    Gastrointestinal: Positive for constipation.   Endocrine: Positive for cold intolerance, heat intolerance, polydipsia and polyuria.   Musculoskeletal: Positive for arthralgias, joint swelling and myalgias.   Neurological: Positive for light-headedness and numbness.   Psychiatric/Behavioral: Positive for confusion, decreased concentration, dysphoric mood and sleep disturbance. The patient is nervous/anxious.    All other systems reviewed and are negative.        Objective:    Physical Exam  Constitutional:       Appearance: Normal appearance. She is obese.   HENT:      Head: Normocephalic and atraumatic.      Mouth/Throat:      Mouth: Mucous membranes are moist.      Pharynx: Oropharynx is clear.      Comments: Class 4 airway  Eyes:      Conjunctiva/sclera: Conjunctivae normal.      Pupils: Pupils are equal, round, and reactive to light.   Cardiovascular:      Rate and Rhythm: Normal rate and regular rhythm.    Pulmonary:      Effort: Pulmonary effort is normal. No respiratory distress.      Breath sounds: Normal breath sounds.   Skin:     General: Skin is warm and dry.   Neurological:      Mental Status: She is alert and oriented to person, place, and time.   Psychiatric:         Mood and Affect: Mood normal.         Behavior: Behavior normal.         Thought Content: Thought content normal.         Judgment: Judgment normal.       21/30 days of use  Greater than 4-hour use 46.7  90% pressure 11.7  AHI of 7.8  Current settings 8-18  ASSESSMENT/PLAN    Diagnoses and all orders for this visit:    1. Obstructive sleep apnea, adult (Primary)  -     CPAP Therapy            1. Counseled patient regarding multimodal approach with healthy nutrition, healthy sleep, regular physical activity, social activities, counseling, and medications. Encouraged to practice lateral  sleep position. Avoid alcohol and sedatives close to bedtime.  2.   Refill supplies x1 year-change settings 10-20 and will reassess compliance and AHI in 5 weeks.  Patient has agreed to this plan of care.  At the end of the visit I did remind patient once again that she needs to change her supplies.  I have reviewed the results of my evaluation and impression and discussed my recommendations in detail with the patient.      Signed by  ELVA Benítez    March 2, 2021      CC: Merle Charlton APRN          No ref. provider found

## 2021-04-13 ENCOUNTER — OFFICE VISIT (OUTPATIENT)
Dept: SLEEP MEDICINE | Facility: HOSPITAL | Age: 56
End: 2021-04-13

## 2021-04-13 VITALS
HEART RATE: 84 BPM | SYSTOLIC BLOOD PRESSURE: 151 MMHG | OXYGEN SATURATION: 95 % | BODY MASS INDEX: 37.35 KG/M2 | WEIGHT: 238 LBS | HEIGHT: 67 IN | DIASTOLIC BLOOD PRESSURE: 92 MMHG

## 2021-04-13 DIAGNOSIS — G47.33 OBSTRUCTIVE SLEEP APNEA, ADULT: Primary | ICD-10-CM

## 2021-04-13 DIAGNOSIS — G47.33 OSA (OBSTRUCTIVE SLEEP APNEA): Primary | ICD-10-CM

## 2021-04-13 PROCEDURE — 99212 OFFICE O/P EST SF 10 MIN: CPT | Performed by: NURSE PRACTITIONER

## 2021-04-13 RX ORDER — LOSARTAN POTASSIUM AND HYDROCHLOROTHIAZIDE 12.5; 1 MG/1; MG/1
TABLET ORAL
COMMUNITY
Start: 2021-03-03 | End: 2022-01-30

## 2021-04-13 NOTE — PROGRESS NOTES
Chief Complaint:   Chief Complaint   Patient presents with   • Follow-up       HPI:    Colette Hatch is a 55 y.o. female here for follow-up of sleep apnea.  Patient was last seen 3 2/1/2021.  We did at that time increase minimum pressure to 10 cm H2O due to an AHI of 7.8.  Patient returns today to reassess.  AHI today still remains 7.9 we will move forward with titration study.  Patient is currently sleeping 7 to 8 hours nightly and does feel rested when using CPAP.  She has an [place] score of 2/24.  Patient states she does wake up frequently throughout the night as her  comes and asks  her many questions.  Patient has been is moving out of their house next week and she feels she will be able to sleep much better.        Current medications are:   Current Outpatient Medications:   •  Blood Glucose Monitoring Suppl (TRUE METRIX METER) w/Device kit, 4 (Four) Times a Day. as directed, Disp: , Rfl: 0  •  cetirizine (zyrTEC) 5 MG tablet, 5 mg., Disp: , Rfl:   •  CloNIDine (CATAPRES) 0.1 MG tablet, take 1 tablet by mouth once daily ...MAY ADD A SECOND DOSE IF NEEDED FOR SYMPTOM MANAGEMENT, Disp: , Rfl: 0  •  DULoxetine (CYMBALTA) 60 MG capsule, Take 60 mg by mouth 2 (Two) Times a Day., Disp: , Rfl:   •  FLUoxetine (PROzac) 20 MG capsule, , Disp: , Rfl: 0  •  FLUoxetine (PROzac) 40 MG capsule, , Disp: , Rfl: 0  •  fluticasone (FLONASE) 50 MCG/ACT nasal spray, , Disp: , Rfl:   •  gabapentin (NEURONTIN) 400 MG capsule, take 1 capsule by mouth two times a day IN THE MORNING, LUNCH, A...  (REFER TO PRESCRIPTION NOTES)., Disp: , Rfl: 0  •  glipiZIDE (GLUCOTROL XL) 5 MG ER tablet, take 1 tablet by mouth once daily with LARGEST MEAL, Disp: , Rfl: 0  •  lamoTRIgine (LaMICtal) 200 MG tablet, , Disp: , Rfl:   •  levothyroxine (SYNTHROID, LEVOTHROID) 25 MCG tablet, , Disp: , Rfl:   •  losartan-hydrochlorothiazide (HYZAAR) 100-12.5 MG per tablet, , Disp: , Rfl:   •  metFORMIN (GLUCOPHAGE) 500 MG tablet, Take 500 mg by mouth  2 (Two) Times a Day With Meals., Disp: , Rfl: 0  •  omeprazole (priLOSEC) 40 MG capsule, , Disp: , Rfl:   •  pravastatin (PRAVACHOL) 40 MG tablet, , Disp: , Rfl:   •  risperiDONE (risperDAL) 2 MG tablet, take 1/2 tablet by mouth four times a day, Disp: , Rfl: 0  •  SITagliptin (JANUVIA) 50 MG tablet, Take 50 mg by mouth Daily., Disp: , Rfl:   •  TRUEPLUS LANCETS 33G misc, 4 (Four) Times a Day. as directed, Disp: , Rfl: 1.      The patient's relevant past medical, surgical, family and social history were reviewed and updated in Epic as appropriate.       Review of Systems   Eyes: Positive for visual disturbance.   Respiratory: Positive for apnea.    Gastrointestinal: Positive for constipation.   Endocrine: Positive for cold intolerance, heat intolerance, polydipsia and polyuria.   Musculoskeletal: Positive for arthralgias, joint swelling and myalgias.   Neurological: Positive for light-headedness and numbness.   Psychiatric/Behavioral: Positive for decreased concentration, dysphoric mood and sleep disturbance. The patient is nervous/anxious.    All other systems reviewed and are negative.        Objective:    Physical Exam  Constitutional:       Appearance: Normal appearance. She is obese.   HENT:      Head: Normocephalic.      Mouth/Throat:      Mouth: Mucous membranes are moist.      Pharynx: Oropharynx is clear.      Comments: Class 4 airway  Eyes:      Conjunctiva/sclera: Conjunctivae normal.      Pupils: Pupils are equal, round, and reactive to light.   Cardiovascular:      Rate and Rhythm: Normal rate and regular rhythm.   Pulmonary:      Effort: Pulmonary effort is normal.      Breath sounds: Normal breath sounds.   Musculoskeletal:      Cervical back: Neck supple.   Skin:     General: Skin is warm and dry.      Coloration: Skin is not jaundiced or pale.   Neurological:      Mental Status: She is alert and oriented to person, place, and time.   Psychiatric:         Mood and Affect: Mood normal.          Behavior: Behavior normal.         Thought Content: Thought content normal.         Judgment: Judgment normal.     30/42 days of use  Greater than 4-hour use 47.6  90% pressure 12.5  AHI of 7.9  Settings 10-20      ASSESSMENT/PLAN    Diagnoses and all orders for this visit:    1. Obstructive sleep apnea, adult (Primary)            1. Counseled patient regarding multimodal approach with healthy nutrition, healthy sleep, regular physical activity, social activities, counseling, and medications. Encouraged to practice lateral sleep position. Avoid alcohol and sedatives close to bedtime.  Patient to have titration study and I will see her back 2 to 3 weeks following  I have reviewed the results of my evaluation and impression and discussed my recommendations in detail with the patient.      Signed by  ELVA Benítez    April 13, 2021      CC: Merle Charlton, ELVA          No ref. provider found

## 2021-04-30 ENCOUNTER — APPOINTMENT (OUTPATIENT)
Dept: PREADMISSION TESTING | Facility: HOSPITAL | Age: 56
End: 2021-04-30

## 2021-04-30 PROCEDURE — U0004 COV-19 TEST NON-CDC HGH THRU: HCPCS

## 2021-04-30 PROCEDURE — C9803 HOPD COVID-19 SPEC COLLECT: HCPCS

## 2021-05-01 LAB — SARS-COV-2 RNA NOSE QL NAA+PROBE: NOT DETECTED

## 2021-05-03 ENCOUNTER — HOSPITAL ENCOUNTER (OUTPATIENT)
Dept: SLEEP MEDICINE | Facility: HOSPITAL | Age: 56
Discharge: HOME | End: 2021-05-03
Admitting: NURSE PRACTITIONER

## 2021-05-03 VITALS
HEIGHT: 67 IN | DIASTOLIC BLOOD PRESSURE: 80 MMHG | OXYGEN SATURATION: 93 % | HEART RATE: 76 BPM | SYSTOLIC BLOOD PRESSURE: 142 MMHG | WEIGHT: 238 LBS | BODY MASS INDEX: 37.35 KG/M2

## 2021-05-03 DIAGNOSIS — G47.33 OSA (OBSTRUCTIVE SLEEP APNEA): ICD-10-CM

## 2021-05-03 PROCEDURE — 95811 POLYSOM 6/>YRS CPAP 4/> PARM: CPT | Performed by: INTERNAL MEDICINE

## 2021-05-03 PROCEDURE — 95811 POLYSOM 6/>YRS CPAP 4/> PARM: CPT

## 2021-05-06 DIAGNOSIS — G47.33 OSA (OBSTRUCTIVE SLEEP APNEA): Primary | ICD-10-CM

## 2021-05-06 NOTE — PROGRESS NOTES
CALLED PATIENT AND ADVISED OF STUDY RESULTS. PATIENT VERBALIZED UNDERSTANDING AND WAS AGREEABLE TO PAP THERAPY. FAXED ORDER TO [facility] 05/06/21 [facility]

## 2021-05-11 PROCEDURE — U0004 COV-19 TEST NON-CDC HGH THRU: HCPCS | Performed by: NURSE PRACTITIONER

## 2021-05-12 ENCOUNTER — TELEPHONE (OUTPATIENT)
Dept: URGENT CARE | Facility: CLINIC | Age: 56
End: 2021-05-12

## 2021-06-01 ENCOUNTER — EPISODE CHANGES (OUTPATIENT)
Dept: CASE MANAGEMENT | Facility: OTHER | Age: 56
End: 2021-06-01

## 2021-06-18 ENCOUNTER — OFFICE VISIT (OUTPATIENT)
Dept: ORTHOPEDIC SURGERY | Facility: CLINIC | Age: 56
End: 2021-06-18

## 2021-06-18 VITALS
DIASTOLIC BLOOD PRESSURE: 95 MMHG | SYSTOLIC BLOOD PRESSURE: 151 MMHG | HEART RATE: 95 BPM | BODY MASS INDEX: 35.99 KG/M2 | HEIGHT: 67 IN | WEIGHT: 229.28 LBS

## 2021-06-18 DIAGNOSIS — M17.11 PRIMARY OSTEOARTHRITIS OF RIGHT KNEE: Primary | ICD-10-CM

## 2021-06-18 PROCEDURE — 99203 OFFICE O/P NEW LOW 30 MIN: CPT | Performed by: ORTHOPAEDIC SURGERY

## 2021-06-18 NOTE — PROGRESS NOTES
Orthopaedic Clinic Note: Knee New Patient    Chief Complaint   Patient presents with   • Right Knee - Pain        HPI  Consult from: SHERYL Xavier    Colette Hatch is a 55 y.o. female who presents with right knee pain for 3 week(s). Onset direct blow. Pain is localized to the anterior knee and is a 7/10 on the pain scale. Pain is described as throbbing and stabbing. Associated symptoms include pain, swelling and grinding. The pain is worse with standing, sitting and rising from seated position; resting, heat, assistive device (cane/walker), lying down and elevating the extremity make it better. Previous treatments have included: nothing since symptom onset. Although some transient relief was reported with these interventions, these conservative measures have failed and symptoms have persisted. The patient is limited in daily activities and has had a significant decrease in quality of life as a result. She denies fevers, chills, or constitutional symptoms.    I have reviewed the following portions of the patient's history:History of Present Illness    Past Medical History:   Diagnosis Date   • Arthritis    • Diabetes mellitus (CMS/HCC)    • Disease of thyroid gland    • Hypertension    • Kidney disease    • Mental disorder    • Spinal stenosis       History reviewed. No pertinent surgical history.   Family History   Problem Relation Age of Onset   • Hypertension Mother    • Obesity Mother    • Diabetes Father    • Hypertension Father    • Obesity Father    • Obesity Sister    • Hypersomnolence Sister    • Obesity Brother    • Sleep apnea Brother    • Heart disease Maternal Aunt    • Narcolepsy Maternal Uncle      Social History     Socioeconomic History   • Marital status:      Spouse name: Not on file   • Number of children: Not on file   • Years of education: Not on file   • Highest education level: Not on file   Tobacco Use   • Smoking status: Former Smoker     Packs/day: 1.00     Years: 14.00      Pack years: 14.00     Types: Cigarettes     Start date:      Quit date: 2007     Years since quittin.6   • Smokeless tobacco: Never Used   Vaping Use   • Vaping Use: Never used   Substance and Sexual Activity   • Alcohol use: No   • Drug use: No   • Sexual activity: Defer      Current Outpatient Medications on File Prior to Visit   Medication Sig Dispense Refill   • albuterol sulfate  (90 Base) MCG/ACT inhaler Inhale 2 puffs Every 4 (Four) Hours As Needed for Wheezing.     • B-D ULTRAFINE III SHORT PEN 31G X 8 MM misc      • benzonatate (TESSALON) 200 MG capsule Take 1 capsule by mouth 3 (Three) Times a Day As Needed for Cough. 30 capsule 0   • Blood Glucose Monitoring Suppl (TRUE METRIX METER) w/Device kit 4 (Four) Times a Day. as directed  0   • cetirizine (zyrTEC) 5 MG tablet 5 mg.     • CloNIDine (CATAPRES) 0.1 MG tablet take 1 tablet by mouth once daily ...MAY ADD A SECOND DOSE IF NEEDED FOR SYMPTOM MANAGEMENT  0   • Diclofenac Sodium (VOLTAREN) 3 % gel gel Apply  topically to the appropriate area as directed 2 (Two) Times a Day. for 60 days. 100 g 0   • DULoxetine (CYMBALTA) 60 MG capsule Take 60 mg by mouth 2 (Two) Times a Day.     • fluticasone (FLONASE) 50 MCG/ACT nasal spray      • gabapentin (NEURONTIN) 400 MG capsule take 1 capsule by mouth two times a day IN THE MORNING, LUNCH, A...  (REFER TO PRESCRIPTION NOTES).  0   • glipiZIDE (GLUCOTROL XL) 5 MG ER tablet take 1 tablet by mouth once daily with LARGEST MEAL  0   • guaifenesin-dextromethorphan (MUCINEX DM)  MG tablet sustained-release 12 hour tablet Take 2 tablets by mouth 2 (Two) Times a Day As Needed (cough and congestion). 40 tablet 0   • IPRATROPIUM BROMIDE IN Inhale.     • lamoTRIgine (LaMICtal) 200 MG tablet      • levothyroxine (SYNTHROID, LEVOTHROID) 25 MCG tablet      • losartan-hydrochlorothiazide (HYZAAR) 100-12.5 MG per tablet      • metFORMIN (GLUCOPHAGE) 500 MG tablet Take 500 mg by mouth 2 (Two) Times a Day  "With Meals.  0   • omeprazole (priLOSEC) 40 MG capsule      • pravastatin (PRAVACHOL) 40 MG tablet      • risperiDONE (risperDAL) 1 MG tablet      • risperiDONE (risperDAL) 2 MG tablet take 1/2 tablet by mouth four times a day  0   • SITagliptin (JANUVIA) 50 MG tablet Take 50 mg by mouth Daily.     • TRUEPLUS LANCETS 33G misc 4 (Four) Times a Day. as directed  1     No current facility-administered medications on file prior to visit.      Allergies   Allergen Reactions   • Latex Rash   • Penicillins Rash        Review of Systems   Constitutional: Negative.    HENT: Negative.    Eyes: Negative.    Respiratory: Negative.    Cardiovascular: Negative.    Gastrointestinal: Negative.    Endocrine: Negative.    Genitourinary: Negative.    Musculoskeletal: Positive for arthralgias.   Skin: Negative.    Allergic/Immunologic: Negative.    Neurological: Negative.    Hematological: Negative.    Psychiatric/Behavioral: Negative.         The patient's Review of Systems was personally reviewed and confirmed as accurate.    The following portions of the patient's history were reviewed and updated as appropriate: allergies, current medications, past family history, past medical history, past social history, past surgical history and problem list.    Physical Exam  Blood pressure 151/95, pulse 95, height 170.2 cm (67.01\"), weight 104 kg (229 lb 4.5 oz).    Body mass index is 35.9 kg/m².    GENERAL APPEARANCE: awake, alert & oriented x 3, in no acute distress and well developed, well nourished  PSYCH: normal affect  LUNGS:  breathing nonlabored  EYES: sclera anicteric  CARDIOVASCULAR: palpable dorsalis pedis, palpable posterior tibial bilaterally. Capillary refill less than 2 seconds  EXTREMITIES: no clubbing, cyanosis  GAIT:  Normal            Right Lower Extremity Exam:   ----------  Hip Exam  ----------  FLEXION CONTRACTURE: None  FLEXION: 110 degrees  INTERNAL ROTATION: 20 degrees at 90 degrees of flexion   EXTERNAL ROTATION: 40 " degrees at 90 degrees of flexion    PAIN WITH HIP MOTION: no  ----------  Knee Exam  ----------  ALIGNMENT: neutral, no varus or valgus deformity     RANGE OF MOTION:  Normal (0-120 degrees) with no extensor lag or flexion contracture  LIGAMENTOUS STABILITY:   stable to varus and valgus stress at terminal extension and 30 degrees without any evidence of laxity     STRENGTH:  5/5 knee flexion, extension. 5/5 ankle dorsiflexion and plantarflexion.     PAIN WITH PALPATION: Trace tenderness about anterior knee, no medial or lateral joint line pain   KNEE EFFUSION: Mild  PAIN WITH KNEE ROM: no  PATELLAR CREPITUS: yes, asymptomatic  SPECIAL EXAM FINDINGS:  Negative patellar compression    REFLEXES:  PATELLAR 2+/4  ACHILLES 2+/4    CLONUS: negative  STRAIGHT LEG TEST:   negative    SENSATION TO LIGHT TOUCH:  DEEP PERONEAL/SUPERFICIAL PERONEAL/SURAL/SAPHENOUS/TIBIAL:   intact    EDEMA:  no  ERYTHEMA:  no  WOUNDS/INCISIONS: no overlying skin problems.      Left Lower Extremity Exam:   ----------  Hip Exam  ----------  FLEXION CONTRACTURE: None  FLEXION: 110 degrees  INTERNAL ROTATION: 20 degrees at 90 degrees of flexion   EXTERNAL ROTATION: 40 degrees at 90 degrees of flexion    PAIN WITH HIP MOTION: no  ----------  Knee Exam  ----------  ALIGNMENT: neutral, no varus or valgus deformity     RANGE OF MOTION:  Normal (0-120 degrees) with no extensor lag or flexion contracture  LIGAMENTOUS STABILITY:   stable to varus and valgus stress at terminal extension and 30 degrees without any evidence of laxity     STRENGTH:  5/5 knee flexion, extension. 5/5 ankle dorsiflexion and plantarflexion.     PAIN WITH PALPATION: denies tenderness to palpation about the knee, denies medial or lateral joint line pain  KNEE EFFUSION: no  PAIN WITH KNEE ROM: no  PATELLAR CREPITUS: yes, asymptomatic  SPECIAL EXAM FINDINGS:  Negative patellar compression    REFLEXES:  PATELLAR 2+/4  ACHILLES 2+/4    CLONUS: negative  STRAIGHT LEG TEST:    negative    SENSATION TO LIGHT TOUCH:  DEEP PERONEAL/SUPERFICIAL PERONEAL/SURAL/SAPHENOUS/TIBIAL:   intact    EDEMA:  no  ERYTHEMA:  no  WOUNDS/INCISIONS: no overlying skin problems.    ______________________________________________________________________  ______________________________________________________________________    RADIOGRAPHIC FINDINGS:   Right knee radiographs from 6/16/2021 are personally reviewed.  Radiographs demonstrate moderate to severe tricompartmental osteoarthritis with significant arthritic changes about the patellofemoral joint.  Small calcifications are seen at superior pole patella.  No acute bony injury or fracture.    Assessment/Plan:   Diagnosis Plan   1. Primary osteoarthritis of right knee       Patient is experiencing an arthritic flareup of the right knee.  I discussed treatment options with her regarding her pain.  She is not able to take oral anti-inflammatory secondary to kidney disease.  I discussed topical anti-inflammatory versus cortisone injection.  She is agreeable to topical Voltaren gel.  She is to take this over-the-counter on as-needed basis.  Over-the-counter Tylenol can also be taken as needed.  She will follow-up as needed.    Jass Garcia MD  06/18/21  10:20 EDT

## 2021-07-24 PROCEDURE — U0004 COV-19 TEST NON-CDC HGH THRU: HCPCS | Performed by: FAMILY MEDICINE

## 2021-08-23 ENCOUNTER — OFFICE VISIT (OUTPATIENT)
Dept: SLEEP MEDICINE | Facility: HOSPITAL | Age: 56
End: 2021-08-23

## 2021-08-23 VITALS
BODY MASS INDEX: 36.82 KG/M2 | DIASTOLIC BLOOD PRESSURE: 80 MMHG | WEIGHT: 234.6 LBS | OXYGEN SATURATION: 97 % | HEIGHT: 67 IN | SYSTOLIC BLOOD PRESSURE: 126 MMHG | HEART RATE: 90 BPM

## 2021-08-23 DIAGNOSIS — G47.33 OSA (OBSTRUCTIVE SLEEP APNEA): Primary | ICD-10-CM

## 2021-08-23 PROCEDURE — 99213 OFFICE O/P EST LOW 20 MIN: CPT | Performed by: NURSE PRACTITIONER

## 2021-08-23 RX ORDER — DULAGLUTIDE 0.75 MG/.5ML
INJECTION, SOLUTION SUBCUTANEOUS
COMMUNITY
Start: 2021-08-17

## 2021-08-23 NOTE — PROGRESS NOTES
"    Chief Complaint:   Chief Complaint   Patient presents with   • Follow-up       HPI:    Colette Hatch is a 56 y.o. female here for follow-up of sleep apnea.  Patient was last seen 7/23/2021.  We had tried unsuccessfully on several occasions to increase settings to lower her AHI.  We did move forward with titration study that did suggest BiPAP be initiated.  However, orders were implemented to increase pressure settings of CPAP.  Patient states that she does do very well with these and increase in settings.  She sleeps 6-1/2 hours nightly when she wears CPAP she does feel rested upon awakening.  Patient will go to sleep within 5 minutes and does not get up during the night.  We did discuss today only 12 out of the last 30 days used with a greater than 4-hour use of 30%.  Patient states they have not had a problem with roaches and have been spraying their house periodically.  She then saw two roaches in her  reservoir.  She does now feel her house is free of roaches and she has cleaned her machine well.  She did sleep with it last night for the first time in a while and felt \"great, slept all night.\"  She does understand she must increase compliance.        Current medications are:   Current Outpatient Medications:   •  albuterol sulfate  (90 Base) MCG/ACT inhaler, Inhale 2 puffs Every 4 (Four) Hours As Needed for Wheezing., Disp: , Rfl:   •  B-D ULTRAFINE III SHORT PEN 31G X 8 MM misc, , Disp: , Rfl:   •  Blood Glucose Monitoring Suppl (TRUE METRIX METER) w/Device kit, 4 (Four) Times a Day. as directed, Disp: , Rfl: 0  •  cetirizine (zyrTEC) 5 MG tablet, 5 mg., Disp: , Rfl:   •  CloNIDine (CATAPRES) 0.1 MG tablet, take 1 tablet by mouth once daily ...MAY ADD A SECOND DOSE IF NEEDED FOR SYMPTOM MANAGEMENT, Disp: , Rfl: 0  •  Diclofenac Sodium (VOLTAREN) 3 % gel gel, Apply  topically to the appropriate area as directed 2 (Two) Times a Day. for 60 days., Disp: 100 g, Rfl: 0  •  DULoxetine (CYMBALTA) 60 MG " capsule, Take 60 mg by mouth 2 (Two) Times a Day., Disp: , Rfl:   •  fluticasone (FLONASE) 50 MCG/ACT nasal spray, , Disp: , Rfl:   •  gabapentin (NEURONTIN) 400 MG capsule, take 1 capsule by mouth two times a day IN THE MORNING, LUNCH, A...  (REFER TO PRESCRIPTION NOTES)., Disp: , Rfl: 0  •  glipiZIDE (GLUCOTROL XL) 5 MG ER tablet, take 1 tablet by mouth once daily with LARGEST MEAL, Disp: , Rfl: 0  •  IPRATROPIUM BROMIDE IN, Inhale., Disp: , Rfl:   •  lamoTRIgine (LaMICtal) 200 MG tablet, , Disp: , Rfl:   •  levothyroxine (SYNTHROID, LEVOTHROID) 25 MCG tablet, , Disp: , Rfl:   •  losartan-hydrochlorothiazide (HYZAAR) 100-12.5 MG per tablet, , Disp: , Rfl:   •  metFORMIN (GLUCOPHAGE) 500 MG tablet, Take 500 mg by mouth 2 (Two) Times a Day With Meals., Disp: , Rfl: 0  •  omeprazole (priLOSEC) 40 MG capsule, , Disp: , Rfl:   •  pravastatin (PRAVACHOL) 40 MG tablet, , Disp: , Rfl:   •  risperiDONE (risperDAL) 1 MG tablet, , Disp: , Rfl:   •  risperiDONE (risperDAL) 2 MG tablet, take 1/2 tablet by mouth four times a day, Disp: , Rfl: 0  •  SITagliptin (JANUVIA) 50 MG tablet, Take 50 mg by mouth Daily., Disp: , Rfl:   •  TRUEPLUS LANCETS 33G misc, 4 (Four) Times a Day. as directed, Disp: , Rfl: 1  •  Trulicity 0.75 MG/0.5ML solution pen-injector, , Disp: , Rfl: .      The patient's relevant past medical, surgical, family and social history were reviewed and updated in Epic as appropriate.       Review of Systems   Eyes: Positive for visual disturbance.   Respiratory: Positive for apnea.    Gastrointestinal: Positive for constipation.   Endocrine: Positive for cold intolerance, heat intolerance, polydipsia and polyuria.   Musculoskeletal: Positive for arthralgias and joint swelling.   Neurological: Positive for light-headedness and numbness.   Psychiatric/Behavioral: Positive for decreased concentration, dysphoric mood and sleep disturbance. The patient is nervous/anxious.    All other systems reviewed and are  negative.        Objective:    Physical Exam  Constitutional:       Appearance: Normal appearance. She is obese.   HENT:      Head: Normocephalic and atraumatic.      Mouth/Throat:      Mouth: Mucous membranes are moist.      Pharynx: Oropharynx is clear.      Comments: Mallampati 4 anatomy  Eyes:      Conjunctiva/sclera: Conjunctivae normal.      Pupils: Pupils are equal, round, and reactive to light.   Cardiovascular:      Rate and Rhythm: Normal rate and regular rhythm.   Pulmonary:      Effort: Pulmonary effort is normal.      Breath sounds: Normal breath sounds.   Skin:     General: Skin is warm and dry.   Neurological:      Mental Status: She is alert and oriented to person, place, and time.   Psychiatric:         Mood and Affect: Mood normal.         Behavior: Behavior normal.         Thought Content: Thought content normal.         Judgment: Judgment normal.       12/30 days of use  Greater than 4-hour use 30%  90% pressure 17.4  You show 4.4  Settings 16-20    ASSESSMENT/PLAN    Diagnoses and all orders for this visit:    1. FREDA (obstructive sleep apnea) (Primary)  -     CPAP Therapy            1. Counseled patient regarding multimodal approach with healthy nutrition, healthy sleep, regular physical activity, social activities, counseling, and medications. Encouraged to practice lateral sleep position. Avoid alcohol and sedatives close to bedtime.  2.   Now the patient has her broach problem under control in her home and her machine is again clean she did resume as of last night I will see her back in 3 months to reassess compliance.  I have reviewed the results of my evaluation and impression and discussed my recommendations in detail with the patient.      Signed by  ELVA Benítez    August 23, 2021      CC: Merle Charlton APRN          No ref. provider found

## 2021-09-10 ENCOUNTER — TELEPHONE (OUTPATIENT)
Dept: SLEEP MEDICINE | Facility: HOSPITAL | Age: 56
End: 2021-09-10

## 2021-11-23 ENCOUNTER — OFFICE VISIT (OUTPATIENT)
Dept: SLEEP MEDICINE | Facility: HOSPITAL | Age: 56
End: 2021-11-23

## 2021-11-23 VITALS — WEIGHT: 225 LBS | HEART RATE: 82 BPM | HEIGHT: 67 IN | OXYGEN SATURATION: 95 % | BODY MASS INDEX: 35.31 KG/M2

## 2021-11-23 DIAGNOSIS — G47.33 OSA (OBSTRUCTIVE SLEEP APNEA): Primary | ICD-10-CM

## 2021-11-23 PROCEDURE — 99213 OFFICE O/P EST LOW 20 MIN: CPT | Performed by: NURSE PRACTITIONER

## 2021-11-23 NOTE — PROGRESS NOTES
Chief Complaint:   Chief Complaint   Patient presents with   • Follow-up       HPI:    Colette Hatch is a 56 y.o. female here for follow-up of sleep apnea.  Patient has a history of obesity, diabetes, heartburn, and sleep apnea.  Patient was last seen 8/23/2021 she had been unsuccessful in the past using CPAP due to needing to clean her machine due to a dennis infestation.  She has now not been using well due to the [employer] recall.  She has been advised to register her serial [email or web address] but that we do strongly encourage continue use even if recall.  She is sleeping 6-1/2 hours nightly and will go to sleep within 5 minutes on the nights she is compliant with CPAP she is rested and has an [place] score of 7/24.  Patient will continue use and has no concerns or complaints today.        Current medications are:   Current Outpatient Medications:   •  albuterol sulfate  (90 Base) MCG/ACT inhaler, Inhale 2 puffs Every 4 (Four) Hours As Needed for Wheezing., Disp: , Rfl:   •  B-D ULTRAFINE III SHORT PEN 31G X 8 MM misc, , Disp: , Rfl:   •  Blood Glucose Monitoring Suppl (TRUE METRIX METER) w/Device kit, 4 (Four) Times a Day. as directed, Disp: , Rfl: 0  •  cetirizine (zyrTEC) 5 MG tablet, 5 mg., Disp: , Rfl:   •  CloNIDine (CATAPRES) 0.1 MG tablet, take 1 tablet by mouth once daily ...MAY ADD A SECOND DOSE IF NEEDED FOR SYMPTOM MANAGEMENT, Disp: , Rfl: 0  •  Diclofenac Sodium (VOLTAREN) 3 % gel gel, Apply  topically to the appropriate area as directed 2 (Two) Times a Day. for 60 days., Disp: 100 g, Rfl: 0  •  DULoxetine (CYMBALTA) 60 MG capsule, Take 60 mg by mouth 2 (Two) Times a Day., Disp: , Rfl:   •  fluticasone (FLONASE) 50 MCG/ACT nasal spray, , Disp: , Rfl:   •  gabapentin (NEURONTIN) 400 MG capsule, take 1 capsule by mouth two times a day IN THE MORNING, LUNCH, A...  (REFER TO PRESCRIPTION NOTES)., Disp: , Rfl: 0  •  glipiZIDE (GLUCOTROL XL) 5 MG ER tablet, take 1 tablet by mouth once daily  with LARGEST MEAL, Disp: , Rfl: 0  •  IPRATROPIUM BROMIDE IN, Inhale., Disp: , Rfl:   •  lamoTRIgine (LaMICtal) 200 MG tablet, , Disp: , Rfl:   •  levothyroxine (SYNTHROID, LEVOTHROID) 25 MCG tablet, , Disp: , Rfl:   •  Liraglutide (VICTOZA SC), Inject  under the skin into the appropriate area as directed., Disp: , Rfl:   •  losartan-hydrochlorothiazide (HYZAAR) 100-12.5 MG per tablet, , Disp: , Rfl:   •  metFORMIN (GLUCOPHAGE) 500 MG tablet, Take 500 mg by mouth 2 (Two) Times a Day With Meals., Disp: , Rfl: 0  •  omeprazole (priLOSEC) 40 MG capsule, , Disp: , Rfl:   •  pravastatin (PRAVACHOL) 40 MG tablet, , Disp: , Rfl:   •  risperiDONE (risperDAL) 1 MG tablet, , Disp: , Rfl:   •  risperiDONE (risperDAL) 2 MG tablet, take 1/2 tablet by mouth four times a day, Disp: , Rfl: 0  •  SITagliptin (JANUVIA) 50 MG tablet, Take 50 mg by mouth Daily., Disp: , Rfl:   •  TRUEPLUS LANCETS 33G misc, 4 (Four) Times a Day. as directed, Disp: , Rfl: 1  •  Trulicity 0.75 MG/0.5ML solution pen-injector, , Disp: , Rfl: .      The patient's relevant past medical, surgical, family and social history were reviewed and updated in Epic as appropriate.       Review of Systems   Eyes: Positive for visual disturbance.   Respiratory: Positive for apnea.    Gastrointestinal: Positive for constipation.   Endocrine: Positive for cold intolerance, heat intolerance, polydipsia and polyuria.   Musculoskeletal: Positive for arthralgias and joint swelling.   Neurological: Positive for light-headedness and numbness.   Psychiatric/Behavioral: Positive for decreased concentration, dysphoric mood and sleep disturbance. The patient is nervous/anxious.    All other systems reviewed and are negative.        Objective:    Physical Exam  Constitutional:       Appearance: Normal appearance.   HENT:      Head: Normocephalic and atraumatic.      Mouth/Throat:      Comments: Mallampati 4 anatomy  Cardiovascular:      Rate and Rhythm: Normal rate and regular  rhythm.   Pulmonary:      Effort: Pulmonary effort is normal.      Breath sounds: Normal breath sounds.   Skin:     General: Skin is warm and dry.   Neurological:      Mental Status: She is alert and oriented to person, place, and time.   Psychiatric:         Mood and Affect: Mood normal.         Behavior: Behavior normal.         Thought Content: Thought content normal.         Judgment: Judgment normal.       54/90 days of use  Greater than 4-hour use 42.2  90% pressure 17.3  AHI 5.1  Settings 16-20    ASSESSMENT/PLAN    Diagnoses and all orders for this visit:    1. FREDA (obstructive sleep apnea) (Primary)  -     CPAP Therapy            1. Counseled patient regarding multimodal approach with healthy nutrition, healthy sleep, regular physical activity, social activities, counseling, and medications. Encouraged to practice lateral sleep position. Avoid alcohol and sedatives close to bedtime.  2. Refill supplies x1 year.  Return to clinic 1 year or sooner symptoms warrant.    I have reviewed the results of my evaluation and impression and discussed my recommendations in detail with the patient.      Signed by  ELVA Benítez    November 23, 2021      CC: Merle Charlton APRN          No ref. provider found

## 2022-04-20 ENCOUNTER — OFFICE VISIT (OUTPATIENT)
Dept: SLEEP MEDICINE | Facility: HOSPITAL | Age: 57
End: 2022-04-20

## 2022-04-20 VITALS
HEART RATE: 78 BPM | HEIGHT: 67 IN | OXYGEN SATURATION: 96 % | DIASTOLIC BLOOD PRESSURE: 68 MMHG | WEIGHT: 214.8 LBS | BODY MASS INDEX: 33.71 KG/M2 | SYSTOLIC BLOOD PRESSURE: 134 MMHG

## 2022-04-20 DIAGNOSIS — G47.33 OSA (OBSTRUCTIVE SLEEP APNEA): Primary | ICD-10-CM

## 2022-04-20 PROCEDURE — 99213 OFFICE O/P EST LOW 20 MIN: CPT | Performed by: NURSE PRACTITIONER

## 2022-04-20 RX ORDER — FLUOXETINE HYDROCHLORIDE 40 MG/1
CAPSULE ORAL
COMMUNITY
Start: 2022-03-25

## 2022-04-20 NOTE — PROGRESS NOTES
Chief Complaint:   Chief Complaint   Patient presents with   • Follow-up       HPI:    Colette Hatch is a 56 y.o. female here for follow-up of sleep apnea.  Patient was last seen 11/23/2021.  Patient was having low compliance on last 2 appointments.  At first it was due to a dennis infestation in her apartment and she was scared to use it as she afraid that they got into her machine.  She was then scared to use it due to the [place] recall I did encourage use and we talked about the consequences of untreated sleep apnea.  Patient did restart therapy.  She sleeps 6-1/2 hours nightly and feels rested upon awakening.  Patient goes to sleep within 5 minutes will get up 1-2 times in the night.  We did discuss today 53 at the last 90 days of use.  Patient states she has had a chronic cough and just realized yesterday it is due to t her new ACE inhibitor.  She will be going off this medication and starting something else.  Patient states she feels much better when using CPAP and will continue and increase therapy.        Current medications are:   Current Outpatient Medications:   •  albuterol sulfate  (90 Base) MCG/ACT inhaler, Inhale 2 puffs Every 4 (Four) Hours As Needed for Wheezing., Disp: , Rfl:   •  amLODIPine (NORVASC) 5 MG tablet, Take 5 mg by mouth Daily., Disp: , Rfl:   •  B-D ULTRAFINE III SHORT PEN 31G X 8 MM misc, , Disp: , Rfl:   •  Blood Glucose Monitoring Suppl (TRUE METRIX METER) w/Device kit, 4 (Four) Times a Day. as directed, Disp: , Rfl: 0  •  CloNIDine (CATAPRES) 0.1 MG tablet, take 1 tablet by mouth once daily ...MAY ADD A SECOND DOSE IF NEEDED FOR SYMPTOM MANAGEMENT, Disp: , Rfl: 0  •  Diclofenac Sodium (VOLTAREN) 3 % gel gel, Apply  topically to the appropriate area as directed 2 (Two) Times a Day. for 60 days., Disp: 100 g, Rfl: 0  •  FLUoxetine (PROzac) 40 MG capsule, , Disp: , Rfl:   •  fluticasone (FLONASE) 50 MCG/ACT nasal spray, , Disp: , Rfl:   •  gabapentin (NEURONTIN) 400 MG  capsule, take 1 capsule by mouth two times a day IN THE MORNING, LUNCH, A...  (REFER TO PRESCRIPTION NOTES)., Disp: , Rfl: 0  •  insulin detemir (LEVEMIR) 100 UNIT/ML injection, Inject 40 Units under the skin into the appropriate area as directed Every 12 (Twelve) Hours., Disp: , Rfl:   •  Insulin Glulisine (APIDRA IJ), Inject 6 Units as directed 3 (Three) Times a Day With Meals., Disp: , Rfl:   •  IPRATROPIUM BROMIDE IN, Inhale., Disp: , Rfl:   •  lamoTRIgine (LaMICtal) 200 MG tablet, , Disp: , Rfl:   •  levothyroxine (SYNTHROID, LEVOTHROID) 25 MCG tablet, , Disp: , Rfl:   •  lisinopril (PRINIVIL,ZESTRIL) 2.5 MG tablet, Take 1 tablet by mouth Daily., Disp: 10 tablet, Rfl: 0  •  metFORMIN (GLUCOPHAGE) 500 MG tablet, Take 500 mg by mouth 2 (Two) Times a Day With Meals., Disp: , Rfl: 0  •  omeprazole (priLOSEC) 40 MG capsule, , Disp: , Rfl:   •  pravastatin (PRAVACHOL) 40 MG tablet, , Disp: , Rfl:   •  risperiDONE (risperDAL) 1 MG tablet, , Disp: , Rfl:   •  risperiDONE (risperDAL) 2 MG tablet, take 1/2 tablet by mouth four times a day, Disp: , Rfl: 0  •  TRUEPLUS LANCETS 33G misc, 4 (Four) Times a Day. as directed, Disp: , Rfl: 1  •  Trulicity 0.75 MG/0.5ML solution pen-injector, , Disp: , Rfl:   •  azithromycin (Zithromax Z-Juan) 250 MG tablet, Take 2 tablets the first day, then 1 tablet daily for 4 days., Disp: 6 tablet, Rfl: 0  •  cetirizine (zyrTEC) 5 MG tablet, 5 mg., Disp: , Rfl:   •  cyclobenzaprine (FLEXERIL) 10 MG tablet, Take 1 at bedtime and up to 3 times daily as tolerated, Disp: 12 tablet, Rfl: 0  •  Dapagliflozin Propanediol 10 MG tablet, Take  by mouth., Disp: , Rfl:   •  DULoxetine (CYMBALTA) 60 MG capsule, Take 60 mg by mouth 2 (Two) Times a Day., Disp: , Rfl:   •  Liraglutide (VICTOZA SC), Inject  under the skin into the appropriate area as directed., Disp: , Rfl:   •  promethazine (PHENERGAN) 25 MG tablet, Take 1 tablet by mouth Every 6 (Six) Hours As Needed for Nausea or Vomiting., Disp: 12 tablet,  Rfl: 0.      The patient's relevant past medical, surgical, family and social history were reviewed and updated in Epic as appropriate.       Review of Systems   Eyes: Positive for visual disturbance.   Respiratory: Positive for apnea.         Current ACE cough   Gastrointestinal: Positive for constipation.   Endocrine: Positive for cold intolerance, heat intolerance, polydipsia and polyuria.   Musculoskeletal: Positive for arthralgias and joint swelling.   Neurological: Positive for light-headedness and numbness.   Psychiatric/Behavioral: Positive for decreased concentration, dysphoric mood and sleep disturbance. The patient is nervous/anxious.    All other systems reviewed and are negative.        Objective:    Physical Exam  Constitutional:       Appearance: Normal appearance.   HENT:      Head: Normocephalic and atraumatic.      Mouth/Throat:      Comments: Mallampati 4 anatomy  Cardiovascular:      Rate and Rhythm: Normal rate and regular rhythm.   Pulmonary:      Effort: Pulmonary effort is normal.      Breath sounds: Normal breath sounds.   Skin:     General: Skin is warm and dry.   Neurological:      Mental Status: She is alert and oriented to person, place, and time.   Psychiatric:         Mood and Affect: Mood normal.         Behavior: Behavior normal.         Thought Content: Thought content normal.         Judgment: Judgment normal.       53/90 days of use  Greater than 4-hour use 43.3  90% pressure 17.0  AHI 4.4  Settings 16-20    ASSESSMENT/PLAN    Diagnoses and all orders for this visit:    1. FREDA (obstructive sleep apnea) (Primary)  -     PAP Therapy            1. Counseled patient regarding multimodal approach with healthy nutrition, healthy sleep, regular physical activity, social activities, counseling, and medications. Encouraged to practice lateral sleep position. Avoid alcohol and sedatives close to bedtime.  2. Refill supplies x1 year.  Return to clinic 1 year or sooner symptoms warrant.    I  have reviewed the results of my evaluation and impression and discussed my recommendations in detail with the patient.      Signed by  Hilaria Villasenor, APRN    April 20, 2022      CC: Merle Charlton, APRN          No ref. provider found

## 2022-06-01 PROCEDURE — U0004 COV-19 TEST NON-CDC HGH THRU: HCPCS | Performed by: FAMILY MEDICINE

## 2022-06-10 PROCEDURE — 87086 URINE CULTURE/COLONY COUNT: CPT | Performed by: NURSE PRACTITIONER

## 2022-07-06 PROCEDURE — U0004 COV-19 TEST NON-CDC HGH THRU: HCPCS | Performed by: NURSE PRACTITIONER

## 2022-08-17 PROCEDURE — 87086 URINE CULTURE/COLONY COUNT: CPT | Performed by: NURSE PRACTITIONER

## 2022-10-05 PROCEDURE — 87088 URINE BACTERIA CULTURE: CPT | Performed by: NURSE PRACTITIONER

## 2022-10-05 PROCEDURE — 87186 SC STD MICRODIL/AGAR DIL: CPT | Performed by: NURSE PRACTITIONER

## 2022-10-05 PROCEDURE — 87086 URINE CULTURE/COLONY COUNT: CPT | Performed by: NURSE PRACTITIONER

## 2022-10-24 PROCEDURE — 87147 CULTURE TYPE IMMUNOLOGIC: CPT | Performed by: NURSE PRACTITIONER

## 2022-10-24 PROCEDURE — 87186 SC STD MICRODIL/AGAR DIL: CPT | Performed by: NURSE PRACTITIONER

## 2022-10-24 PROCEDURE — 87086 URINE CULTURE/COLONY COUNT: CPT | Performed by: NURSE PRACTITIONER

## 2023-02-14 ENCOUNTER — OFFICE VISIT (OUTPATIENT)
Dept: SLEEP MEDICINE | Facility: HOSPITAL | Age: 58
End: 2023-02-14
Payer: MEDICARE

## 2023-02-14 VITALS
BODY MASS INDEX: 32.65 KG/M2 | WEIGHT: 208 LBS | HEIGHT: 67 IN | DIASTOLIC BLOOD PRESSURE: 79 MMHG | OXYGEN SATURATION: 94 % | SYSTOLIC BLOOD PRESSURE: 135 MMHG | HEART RATE: 74 BPM

## 2023-02-14 DIAGNOSIS — G47.33 OSA (OBSTRUCTIVE SLEEP APNEA): Primary | ICD-10-CM

## 2023-02-14 DIAGNOSIS — G47.34 NOCTURNAL HYPOXEMIA: ICD-10-CM

## 2023-02-14 PROCEDURE — 99213 OFFICE O/P EST LOW 20 MIN: CPT | Performed by: NURSE PRACTITIONER

## 2023-02-14 RX ORDER — LORATADINE 10 MG/1
10 TABLET ORAL DAILY
COMMUNITY

## 2023-02-14 NOTE — PROGRESS NOTES
Chief Complaint:   Chief Complaint   Patient presents with   • Follow-up       HPI:    Colette Hatch is a 57 y.o. female here for follow-up of sleep apnea.  Patient was last seen 4/20/2022.  Patient continues to do well with CPAP therapy.  Patient sleeps 6-1/2 hours nightly and usually feels rested upon awakening.  She goes to sleep quickly and does get up 1-2 times in the night.  Patient states her Apple Watch has been showing mixed O2 sat pressures at 88 and 89 and we will get an O2 sat while wearing CPAP just to recheck tonight she will then be called with these results.  Otherwise she has no concerns or complaints and will continue therapy.        Current medications are:   Current Outpatient Medications:   •  amLODIPine (NORVASC) 5 MG tablet, Take 5 mg by mouth Daily., Disp: , Rfl:   •  B-D ULTRAFINE III SHORT PEN 31G X 8 MM misc, , Disp: , Rfl:   •  Blood Glucose Monitoring Suppl (TRUE METRIX METER) w/Device kit, 4 (Four) Times a Day. as directed, Disp: , Rfl: 0  •  buPROPion XL (WELLBUTRIN XL) 150 MG 24 hr tablet, , Disp: , Rfl:   •  CloNIDine (CATAPRES) 0.1 MG tablet, take 1 tablet by mouth once daily ...MAY ADD A SECOND DOSE IF NEEDED FOR SYMPTOM MANAGEMENT, Disp: , Rfl: 0  •  Dapagliflozin Propanediol (FARXIGA PO), Take  by mouth., Disp: , Rfl:   •  FLUoxetine (PROzac) 20 MG capsule, , Disp: , Rfl:   •  FLUoxetine (PROzac) 40 MG capsule, , Disp: , Rfl:   •  fluticasone (FLONASE) 50 MCG/ACT nasal spray, , Disp: , Rfl:   •  gabapentin (NEURONTIN) 300 MG capsule, , Disp: , Rfl:   •  glucose blood test strip, , Disp: , Rfl:   •  insulin detemir (LEVEMIR) 100 UNIT/ML injection, Inject 35 Units under the skin into the appropriate area as directed Every 12 (Twelve) Hours., Disp: , Rfl:   •  ipratropium (ATROVENT) 0.06 % nasal spray, , Disp: , Rfl:   •  lamoTRIgine (LaMICtal) 200 MG tablet, , Disp: , Rfl:   •  levothyroxine (SYNTHROID, LEVOTHROID) 25 MCG tablet, , Disp: , Rfl:   •  loratadine (CLARITIN) 10 MG  tablet, Take 10 mg by mouth Daily., Disp: , Rfl:   •  losartan (COZAAR) 25 MG tablet, , Disp: , Rfl:   •  metFORMIN (GLUCOPHAGE) 500 MG tablet, Take 500 mg by mouth 2 (Two) Times a Day With Meals., Disp: , Rfl: 0  •  omeprazole (priLOSEC) 40 MG capsule, , Disp: , Rfl:   •  pravastatin (PRAVACHOL) 40 MG tablet, , Disp: , Rfl:   •  promethazine (PHENERGAN) 25 MG tablet, Take 1 tablet by mouth Every 6 (Six) Hours As Needed for Nausea or Vomiting., Disp: 12 tablet, Rfl: 0  •  risperiDONE (risperDAL) 1 MG tablet, , Disp: , Rfl:   •  risperiDONE (risperDAL) 2 MG tablet, TAKE 1/2 TABLET BY MOUTH FOUR TIMES DAILY, Disp: , Rfl:   •  TRUEPLUS LANCETS 33G misc, 4 (Four) Times a Day. as directed, Disp: , Rfl: 1  •  Trulicity 0.75 MG/0.5ML solution pen-injector, , Disp: , Rfl: .      The patient's relevant past medical, surgical, family and social history were reviewed and updated in Epic as appropriate.       Review of Systems   Eyes: Positive for visual disturbance.   Respiratory: Positive for apnea.    Gastrointestinal: Positive for constipation and diarrhea.        Heartburn   Musculoskeletal: Positive for arthralgias and joint swelling.   Neurological: Positive for light-headedness and numbness.   Psychiatric/Behavioral: Positive for decreased concentration, dysphoric mood and sleep disturbance. The patient is nervous/anxious.    All other systems reviewed and are negative.        Objective:    Physical Exam  Constitutional:       Appearance: Normal appearance.   HENT:      Head: Normocephalic and atraumatic.      Mouth/Throat:      Comments: Class 4 airway  Cardiovascular:      Rate and Rhythm: Normal rate and regular rhythm.   Pulmonary:      Effort: Pulmonary effort is normal.      Breath sounds: Normal breath sounds.   Skin:     General: Skin is warm and dry.   Neurological:      Mental Status: She is alert.   Psychiatric:         Mood and Affect: Mood normal.         Behavior: Behavior normal.         Thought Content:  "Thought content normal.         Judgment: Judgment normal.     /79   Pulse 74   Ht 170.2 cm (67\")   Wt 94.3 kg (208 lb)   SpO2 94%   BMI 32.58 kg/m²       CPAP Report  74 out of 90 days of use  Greater than 4-hour use 67.8  90% pressure 17.0  AHI of 2.5  Settings 16-20    The patient continues to use and benefit from CPAP therapy.    ASSESSMENT/PLAN    Diagnoses and all orders for this visit:    1. FREDA (obstructive sleep apnea) (Primary)  -     PAP Therapy  -     Overnight Sleep Oximetry Study; Future    2. Nocturnal hypoxemia  Comments:  per pt Apple watch  Orders:  -     Overnight Sleep Oximetry Study; Future        1. Counseled patient regarding multimodal approach with healthy nutrition, healthy sleep, regular physical activity, social activities, counseling, and medications. Encouraged to practice lateral sleep position. Avoid alcohol and sedatives close to bedtime.  2.   Refill supplies x1 year.  Overnight oximetry while wearing CPAP patient will be called with these results.  I will see patient back in 1 year or sooner if symptoms warrant.    I have reviewed the results of my evaluation and impression and discussed my recommendations in detail with the patient.      Signed by  ELVA Benítez    February 14, 2023      CC: Merle Charlton, ELVA         No ref. provider found      "

## 2023-02-21 ENCOUNTER — TELEPHONE (OUTPATIENT)
Dept: SLEEP MEDICINE | Facility: HOSPITAL | Age: 58
End: 2023-02-21

## 2023-02-21 NOTE — TELEPHONE ENCOUNTER
Caller: Colette Hatch    Relationship: Self    Best call back number: [phone]    What is the best time to reach you: ANYTIME BEFORE 4PM     Who are you requesting to speak with (clinical staff, provider,  specific staff member): CLINICAL STAFF    What was the call regarding: PT REQUESTS FU INFO FROM LAST VISIT. PT WAS EXPECTING TO HEAR BACK CONCERNING AN OXYGEN TEST. HAS NOT HEARD ANYTHING FROM ANYONE    Do you require a callback: YES PLEASE

## 2023-02-24 DIAGNOSIS — G47.34 NOCTURNAL HYPOXEMIA: ICD-10-CM

## 2023-02-24 DIAGNOSIS — G47.33 OSA (OBSTRUCTIVE SLEEP APNEA): ICD-10-CM

## 2023-02-28 ENCOUNTER — TELEPHONE (OUTPATIENT)
Dept: SLEEP MEDICINE | Facility: HOSPITAL | Age: 58
End: 2023-02-28
Payer: MEDICARE

## 2023-04-17 PROCEDURE — 87086 URINE CULTURE/COLONY COUNT: CPT | Performed by: NURSE PRACTITIONER

## 2023-09-11 ENCOUNTER — TELEPHONE (OUTPATIENT)
Dept: SLEEP MEDICINE | Facility: HOSPITAL | Age: 58
End: 2023-09-11

## 2023-09-11 NOTE — TELEPHONE ENCOUNTER
Pt called with concerns about her oxygen levels when she is sleeping. Pt states that when she is sleeping her oxygen levels are dipping below 80. She would like someone to give her a call.

## 2023-09-12 ENCOUNTER — TELEMEDICINE (OUTPATIENT)
Dept: SLEEP MEDICINE | Facility: CLINIC | Age: 58
End: 2023-09-12
Payer: MEDICARE

## 2023-09-12 VITALS — WEIGHT: 203 LBS | HEIGHT: 67 IN | BODY MASS INDEX: 31.86 KG/M2

## 2023-09-12 DIAGNOSIS — G47.33 OSA (OBSTRUCTIVE SLEEP APNEA): Primary | ICD-10-CM

## 2023-09-12 DIAGNOSIS — G47.34 NOCTURNAL HYPOXEMIA: ICD-10-CM

## 2023-09-12 NOTE — PROGRESS NOTES
Chief Complaint:   Chief Complaint   Patient presents with    Follow-up       HPI:    Colette Hatch is a 58 y.o. female here for follow-up of sleep apnea.  Patient was last seen 2/14/2023.  Have not done an overnight oximetry on patient while she was wearing CPAP due to her Apple watch showing low oxygen of 88.  Patient does not know if that was a low oxygen consecutive for at least 5 minutes.  On arrival O2 sat she did not drop below 88% at all.  Patient states her Apple Watch has been showing she is dropping below 88 again and does not feel good the next day.  She feels she needs more than a 24-hour oximetry to assess.  We will do a 72-hour while she wears her CPAP.        Current medications are:   Current Outpatient Medications:     B-D ULTRAFINE III SHORT PEN 31G X 8 MM misc, , Disp: , Rfl:     Blood Glucose Monitoring Suppl (TRUE METRIX METER) w/Device kit, 4 (Four) Times a Day. as directed, Disp: , Rfl: 0    Diclofenac Sodium (VOLTAREN) 1 % gel gel, , Disp: , Rfl:     estradiol (ESTRACE) 0.1 MG/GM vaginal cream, INSERT 1 GRAM INTO THE VAGINA 3 TIMES PER WEEK, Disp: , Rfl:     Farxiga 10 MG tablet, Take 10 mg by mouth Daily., Disp: , Rfl:     FLUoxetine (PROzac) 40 MG capsule, , Disp: , Rfl:     fluticasone (FLONASE) 50 MCG/ACT nasal spray, 2 sprays into the nostril(s) as directed by provider Daily As Needed for Rhinitis or Allergies (nasal/sinus congestion) for up to 14 days. 2 puffs each nostril, Disp: 9.9 mL, Rfl: 0    gabapentin (NEURONTIN) 300 MG capsule, Take 1 capsule by mouth 3 (Three) Times a Day., Disp: , Rfl:     glucose blood test strip, , Disp: , Rfl:     ipratropium (ATROVENT) 0.06 % nasal spray, , Disp: , Rfl:     lamoTRIgine (LaMICtal) 200 MG tablet, , Disp: , Rfl:     Lantus SoloStar 100 UNIT/ML injection pen, , Disp: , Rfl:     levothyroxine (SYNTHROID, LEVOTHROID) 25 MCG tablet, , Disp: , Rfl:     metFORMIN (GLUCOPHAGE) 500 MG tablet, Take 500 mg by mouth 2 (Two) Times a Day With Meals.,  Disp: , Rfl: 0    methenamine (HIPREX) 1 g tablet, , Disp: , Rfl:     omeprazole (priLOSEC) 20 MG capsule, , Disp: , Rfl:     ondansetron (ZOFRAN) 4 MG tablet, Take  by mouth., Disp: , Rfl:     pravastatin (PRAVACHOL) 40 MG tablet, , Disp: , Rfl:     risperiDONE (risperDAL) 1 MG tablet, , Disp: , Rfl:     risperiDONE (risperDAL) 2 MG tablet, TAKE 1/2 TABLET BY MOUTH FOUR TIMES DAILY, Disp: , Rfl:     traZODone (DESYREL) 50 MG tablet, TAKE 1/2 TO 1 TABLET BY MOUTH EVERY DAY AT BEDTIME AS NEEDED, Disp: , Rfl:     triamcinolone (KENALOG) 0.1 % cream, Apply 1 application  topically to the appropriate area as directed 2 (Two) Times a Day., Disp: 45 g, Rfl: 0    TRUEPLUS LANCETS 33G misc, 4 (Four) Times a Day. as directed, Disp: , Rfl: 1    Trulicity 0.75 MG/0.5ML solution pen-injector, , Disp: , Rfl: .      The patient's relevant past medical, surgical, family and social history were reviewed and updated in Epic as appropriate.       Review of Systems   Eyes:  Positive for visual disturbance.   Respiratory:  Positive for apnea.    Gastrointestinal:  Positive for constipation and diarrhea.        Heartburn   Musculoskeletal:  Positive for arthralgias and joint swelling.   Neurological:  Positive for light-headedness and numbness.   Psychiatric/Behavioral:  Positive for decreased concentration, dysphoric mood and sleep disturbance. The patient is nervous/anxious.    All other systems reviewed and are negative.      Objective:    Physical Exam  Constitutional:       Appearance: Normal appearance.   HENT:      Head: Normocephalic and atraumatic.      Mouth/Throat:      Comments: Mallampati 4 anatomy  Pulmonary:      Effort: Pulmonary effort is normal. No respiratory distress.   Neurological:      Mental Status: She is alert and oriented to person, place, and time.   Psychiatric:         Mood and Affect: Mood normal.         Behavior: Behavior normal.         Thought Content: Thought content normal.         Judgment: Judgment  normal.       CPAP Report  No download today    The patient continues to use and benefit from CPAP therapy.    ASSESSMENT/PLAN    Diagnoses and all orders for this visit:    1. FREDA (obstructive sleep apnea) (Primary)  -     Overnight Sleep Oximetry Study; Future    2. Nocturnal hypoxemia  -     Overnight Sleep Oximetry Study; Future        Counseled patient regarding multimodal approach with healthy nutrition, healthy sleep, regular physical activity, social activities, counseling, and medications. Encouraged to practice lateral sleep position. Avoid alcohol and sedatives close to bedtime.    For patient peace of mind and to see if Apple watch is correct we will do a 72-hour O2 sat and follow-up as appropriate.    I have reviewed the results of my evaluation and impression and discussed my recommendations in detail with the patient.      Signed by  ELVA Benítez    September 12, 2023      CC: Merle Charlton APRN         No ref. provider found

## 2024-01-08 PROCEDURE — 0202U NFCT DS 22 TRGT SARS-COV-2: CPT | Performed by: FAMILY MEDICINE

## 2024-02-19 ENCOUNTER — PATIENT ROUNDING (BHMG ONLY) (OUTPATIENT)
Dept: URGENT CARE | Facility: CLINIC | Age: 59
End: 2024-02-19
Payer: MEDICARE

## 2024-02-19 NOTE — ED NOTES
Thank you for letting us care for you in your recent visit to our urgent care center. We would love to hear about your experience with us. Was this the first time you have visited our location?    We’re always looking for ways to make our patients’ experiences even better. Do you have any recommendations on ways we may improve?     I appreciate you taking the time to respond. Please be on the lookout for a survey about your recent visit from [employer] via text or email. We would greatly appreciate if you could fill that out and turn it back in. We want your voice to be heard and we value your feedback.   Thank you for choosing [facility] for your healthcare needs.

## 2024-03-26 ENCOUNTER — OFFICE VISIT (OUTPATIENT)
Dept: SLEEP MEDICINE | Facility: CLINIC | Age: 59
End: 2024-03-26
Payer: MEDICARE

## 2024-03-26 VITALS
OXYGEN SATURATION: 96 % | TEMPERATURE: 96.6 F | DIASTOLIC BLOOD PRESSURE: 86 MMHG | HEIGHT: 67 IN | SYSTOLIC BLOOD PRESSURE: 132 MMHG | WEIGHT: 201 LBS | HEART RATE: 84 BPM | BODY MASS INDEX: 31.55 KG/M2

## 2024-03-26 DIAGNOSIS — G47.34 NOCTURNAL HYPOXEMIA: ICD-10-CM

## 2024-03-26 DIAGNOSIS — G47.33 OSA (OBSTRUCTIVE SLEEP APNEA): Primary | ICD-10-CM

## 2024-03-26 PROCEDURE — 99213 OFFICE O/P EST LOW 20 MIN: CPT | Performed by: NURSE PRACTITIONER

## 2024-03-26 NOTE — PROGRESS NOTES
Chief Complaint:   Chief Complaint   Patient presents with    Follow-up    Sleep Apnea       HPI:    Colette Hatch is a 58 y.o. female here for follow-up of sleep apnea.  Patient was last seen 9/12/2023.  We had checked her oxygen level before while wearing CPAP and this was normal.  However, patient states her Apple Watch was showing O2 sat in the 80s.  We did order another pulse ox at this time per her request.    Patient thinks she did have a pulse ox done in September and she did having this on the door when she was finished at their instruction.  Her DME states they were never able to get in touch with her to get this done.  I am not sure where the miscommunication has happened but I will order a new 1 today.    Current medications are:   Current Outpatient Medications:     B-D ULTRAFINE III SHORT PEN 31G X 8 MM misc, , Disp: , Rfl:     Blood Glucose Monitoring Suppl (TRUE METRIX METER) w/Device kit, 4 (Four) Times a Day. as directed, Disp: , Rfl: 0    Diclofenac Sodium (VOLTAREN) 1 % gel gel, , Disp: , Rfl:     DM-APAP-CPM (Coricidin HBP) -2 MG tablet, Take 2 tablets by mouth Every 4 (Four) Hours As Needed (cough/congestion)., Disp: 24 tablet, Rfl: 0    Dulaglutide 4.5 MG/0.5ML solution pen-injector, , Disp: , Rfl:     estradiol (ESTRACE) 0.1 MG/GM vaginal cream, INSERT 1 GRAM INTO THE VAGINA 3 TIMES PER WEEK, Disp: , Rfl:     Farxiga 10 MG tablet, Take 10 mg by mouth Daily., Disp: , Rfl:     FLUoxetine (PROzac) 40 MG capsule, , Disp: , Rfl:     gabapentin (NEURONTIN) 300 MG capsule, Take 1 capsule by mouth 3 (Three) Times a Day., Disp: , Rfl:     glucose blood test strip, , Disp: , Rfl:     ipratropium (ATROVENT) 0.06 % nasal spray, , Disp: , Rfl:     lamoTRIgine (LaMICtal) 200 MG tablet, , Disp: , Rfl:     Lantus SoloStar 100 UNIT/ML injection pen, , Disp: , Rfl:     levothyroxine (SYNTHROID, LEVOTHROID) 25 MCG tablet, , Disp: , Rfl:     metFORMIN (GLUCOPHAGE) 500 MG tablet, Take 1 tablet by mouth 2  (Two) Times a Day With Meals., Disp: , Rfl: 0    methenamine (HIPREX) 1 g tablet, , Disp: , Rfl:     omeprazole (priLOSEC) 20 MG capsule, , Disp: , Rfl:     ondansetron (ZOFRAN) 4 MG tablet, Take  by mouth., Disp: , Rfl:     pravastatin (PRAVACHOL) 40 MG tablet, , Disp: , Rfl:     risperiDONE (risperDAL) 1 MG tablet, , Disp: , Rfl:     risperiDONE (risperDAL) 2 MG tablet, TAKE 1/2 TABLET BY MOUTH FOUR TIMES DAILY, Disp: , Rfl:     traZODone (DESYREL) 50 MG tablet, TAKE 1/2 TO 1 TABLET BY MOUTH EVERY DAY AT BEDTIME AS NEEDED, Disp: , Rfl:     triamcinolone (KENALOG) 0.1 % cream, Apply 1 application  topically to the appropriate area as directed 2 (Two) Times a Day., Disp: 45 g, Rfl: 0    TRUEPLUS LANCETS 33G misc, 4 (Four) Times a Day. as directed, Disp: , Rfl: 1    fluticasone (FLONASE) 50 MCG/ACT nasal spray, 2 sprays into the nostril(s) as directed by provider Daily As Needed for Rhinitis or Allergies (nasal/sinus congestion) for up to 14 days. 2 puffs each nostril, Disp: 9.9 mL, Rfl: 0.      The patient's relevant past medical, surgical, family and social history were reviewed and updated in Epic as appropriate.       Review of Systems   Eyes:  Positive for visual disturbance.   Respiratory:  Positive for apnea.    Gastrointestinal:  Positive for constipation and diarrhea.        Heartburn   Musculoskeletal:  Positive for arthralgias and joint swelling.   Neurological:  Positive for light-headedness and numbness.   Psychiatric/Behavioral:  Positive for decreased concentration, dysphoric mood and sleep disturbance. The patient is nervous/anxious.    All other systems reviewed and are negative.        Objective:    Physical Exam  Constitutional:       Appearance: Normal appearance.   HENT:      Head: Normocephalic and atraumatic.      Mouth/Throat:      Comments: Mallampati 4 anatomy  Cardiovascular:      Rate and Rhythm: Normal rate.   Pulmonary:      Effort: Pulmonary effort is normal. No respiratory distress.    Skin:     General: Skin is warm and dry.   Neurological:      Mental Status: She is alert and oriented to person, place, and time.   Psychiatric:         Mood and Affect: Mood normal.         Behavior: Behavior normal.         Thought Content: Thought content normal.         Judgment: Judgment normal.         CPAP Report    89/90 days of use  Greater than 4-hour use 91.1  90% pressure 17.0  AHI of 2.5  Settings 16-20  The patient continues to use and benefit from CPAP therapy.    ASSESSMENT/PLAN    Diagnoses and all orders for this visit:    1. FREDA (obstructive sleep apnea) (Primary)  -     PAP Therapy  -     Overnight Sleep Oximetry Study; Future    2. Nocturnal hypoxemia  -     Overnight Sleep Oximetry Study; Future        Counseled patient regarding multimodal approach with healthy nutrition, healthy sleep, regular physical activity, social activities, counseling, and medications. Encouraged to practice lateral sleep position. Avoid alcohol and sedatives close to bedtime.  Fill supplies x 1 year.  I will will order another overnight oximetry while wearing CPAP and patient will be notified of these findings.        Signed by  ELVA Benítez    March 26, 2024      CC: Merle Charlton APRN         Referring, Self

## 2024-07-17 ENCOUNTER — APPOINTMENT (OUTPATIENT)
Dept: GENERAL RADIOLOGY | Facility: HOSPITAL | Age: 59
End: 2024-07-17
Payer: MEDICARE

## 2024-07-17 ENCOUNTER — HOSPITAL ENCOUNTER (EMERGENCY)
Facility: HOSPITAL | Age: 59
Discharge: HOME | End: 2024-07-17
Attending: EMERGENCY MEDICINE
Payer: MEDICARE

## 2024-07-17 VITALS
HEIGHT: 67 IN | BODY MASS INDEX: 32.49 KG/M2 | HEART RATE: 69 BPM | OXYGEN SATURATION: 93 % | TEMPERATURE: 97.5 F | SYSTOLIC BLOOD PRESSURE: 109 MMHG | WEIGHT: 207 LBS | DIASTOLIC BLOOD PRESSURE: 72 MMHG | RESPIRATION RATE: 18 BRPM

## 2024-07-17 DIAGNOSIS — E11.649 HYPOGLYCEMIA ASSOCIATED WITH DIABETES: Primary | ICD-10-CM

## 2024-07-17 LAB
ALBUMIN SERPL-MCNC: 4.5 G/DL (ref 3.5–5.2)
ALBUMIN/GLOB SERPL: 1.6 G/DL
ALP SERPL-CCNC: 100 U/L (ref 39–117)
ALT SERPL W P-5'-P-CCNC: 25 U/L (ref 1–33)
ANION GAP SERPL CALCULATED.3IONS-SCNC: 15 MMOL/L (ref 5–15)
AST SERPL-CCNC: 33 U/L (ref 1–32)
BACTERIA UR QL AUTO: NORMAL /HPF
BASOPHILS # BLD AUTO: 0.06 10*3/MM3 (ref 0–0.2)
BASOPHILS NFR BLD AUTO: 0.9 % (ref 0–1.5)
BILIRUB SERPL-MCNC: 0.3 MG/DL (ref 0–1.2)
BILIRUB UR QL STRIP: NEGATIVE
BUN SERPL-MCNC: 18 MG/DL (ref 6–20)
BUN/CREAT SERPL: 14.5 (ref 7–25)
CALCIUM SPEC-SCNC: 9.9 MG/DL (ref 8.6–10.5)
CHLORIDE SERPL-SCNC: 98 MMOL/L (ref 98–107)
CLARITY UR: CLEAR
CO2 SERPL-SCNC: 24 MMOL/L (ref 22–29)
COLOR UR: YELLOW
CREAT SERPL-MCNC: 1.24 MG/DL (ref 0.57–1)
D-LACTATE SERPL-SCNC: 1.1 MMOL/L (ref 0.5–2)
DEPRECATED RDW RBC AUTO: 43.6 FL (ref 37–54)
EGFRCR SERPLBLD CKD-EPI 2021: 50.5 ML/MIN/1.73
EOSINOPHIL # BLD AUTO: 0.21 10*3/MM3 (ref 0–0.4)
EOSINOPHIL NFR BLD AUTO: 3.1 % (ref 0.3–6.2)
ERYTHROCYTE [DISTWIDTH] IN BLOOD BY AUTOMATED COUNT: 13.3 % (ref 12.3–15.4)
GLOBULIN UR ELPH-MCNC: 2.8 GM/DL
GLUCOSE BLDC GLUCOMTR-MCNC: 106 MG/DL (ref 70–130)
GLUCOSE BLDC GLUCOMTR-MCNC: 118 MG/DL (ref 70–130)
GLUCOSE SERPL-MCNC: 114 MG/DL (ref 65–99)
GLUCOSE UR STRIP-MCNC: ABNORMAL MG/DL
HCT VFR BLD AUTO: 44.5 % (ref 34–46.6)
HGB BLD-MCNC: 14.6 G/DL (ref 12–15.9)
HGB UR QL STRIP.AUTO: NEGATIVE
HYALINE CASTS UR QL AUTO: NORMAL /LPF
IMM GRANULOCYTES # BLD AUTO: 0.02 10*3/MM3 (ref 0–0.05)
IMM GRANULOCYTES NFR BLD AUTO: 0.3 % (ref 0–0.5)
KETONES UR QL STRIP: NEGATIVE
LEUKOCYTE ESTERASE UR QL STRIP.AUTO: ABNORMAL
LYMPHOCYTES # BLD AUTO: 1.64 10*3/MM3 (ref 0.7–3.1)
LYMPHOCYTES NFR BLD AUTO: 24.3 % (ref 19.6–45.3)
MCH RBC QN AUTO: 29.4 PG (ref 26.6–33)
MCHC RBC AUTO-ENTMCNC: 32.8 G/DL (ref 31.5–35.7)
MCV RBC AUTO: 89.5 FL (ref 79–97)
MONOCYTES # BLD AUTO: 0.74 10*3/MM3 (ref 0.1–0.9)
MONOCYTES NFR BLD AUTO: 11 % (ref 5–12)
NEUTROPHILS NFR BLD AUTO: 4.07 10*3/MM3 (ref 1.7–7)
NEUTROPHILS NFR BLD AUTO: 60.4 % (ref 42.7–76)
NITRITE UR QL STRIP: NEGATIVE
NRBC BLD AUTO-RTO: 0 /100 WBC (ref 0–0.2)
NT-PROBNP SERPL-MCNC: 47.5 PG/ML (ref 0–900)
PH UR STRIP.AUTO: 6.5 [PH] (ref 5–8)
PLATELET # BLD AUTO: 229 10*3/MM3 (ref 140–450)
PMV BLD AUTO: 10.3 FL (ref 6–12)
POTASSIUM SERPL-SCNC: 4 MMOL/L (ref 3.5–5.2)
PROT SERPL-MCNC: 7.3 G/DL (ref 6–8.5)
PROT UR QL STRIP: NEGATIVE
RBC # BLD AUTO: 4.97 10*6/MM3 (ref 3.77–5.28)
RBC # UR STRIP: NORMAL /HPF
REF LAB TEST METHOD: NORMAL
SODIUM SERPL-SCNC: 137 MMOL/L (ref 136–145)
SP GR UR STRIP: <=1.005 (ref 1–1.03)
SQUAMOUS #/AREA URNS HPF: NORMAL /HPF
TROPONIN T SERPL HS-MCNC: 13 NG/L
UROBILINOGEN UR QL STRIP: ABNORMAL
WBC # UR STRIP: NORMAL /HPF
WBC NRBC COR # BLD AUTO: 6.74 10*3/MM3 (ref 3.4–10.8)

## 2024-07-17 PROCEDURE — 83605 ASSAY OF LACTIC ACID: CPT | Performed by: EMERGENCY MEDICINE

## 2024-07-17 PROCEDURE — 80053 COMPREHEN METABOLIC PANEL: CPT | Performed by: EMERGENCY MEDICINE

## 2024-07-17 PROCEDURE — 83880 ASSAY OF NATRIURETIC PEPTIDE: CPT | Performed by: EMERGENCY MEDICINE

## 2024-07-17 PROCEDURE — 84484 ASSAY OF TROPONIN QUANT: CPT | Performed by: EMERGENCY MEDICINE

## 2024-07-17 PROCEDURE — 85025 COMPLETE CBC W/AUTO DIFF WBC: CPT | Performed by: EMERGENCY MEDICINE

## 2024-07-17 PROCEDURE — 81001 URINALYSIS AUTO W/SCOPE: CPT | Performed by: EMERGENCY MEDICINE

## 2024-07-17 PROCEDURE — 99283 EMERGENCY DEPT VISIT LOW MDM: CPT

## 2024-07-17 PROCEDURE — 71045 X-RAY EXAM CHEST 1 VIEW: CPT

## 2024-07-17 PROCEDURE — 82948 REAGENT STRIP/BLOOD GLUCOSE: CPT

## 2024-07-17 RX ORDER — SODIUM CHLORIDE 0.9 % (FLUSH) 0.9 %
10 SYRINGE (ML) INJECTION AS NEEDED
Status: DISCONTINUED | OUTPATIENT
Start: 2024-07-17 | End: 2024-07-17 | Stop reason: HOSPADM

## 2024-07-17 NOTE — ED PROVIDER NOTES
"Subjective   History of Present Illness  Patient presents for recurrent episodes of hypoglycemia over the past 24 to 36 hours.  Patient has a glucose monitor and it is alerted her several times that her blood sugar was low in the 50s and 60s.  She feels dizzy/lightheaded, palpitations and just generally unwell when this happened.  She has been eating food and drinks such as cola, sugary foods which will temporarily bring her blood sugar back up but then it persistently drops.  Patient has not had any recent changes to her medication regimen, takes insulin once daily in the morning, oral medications metformin and Farxiga.  Patient states she has been \"scatterbrained\" a little bit lately and she could have possibly taken a dose of her morning insulin twice today but she is not certain.    Patient's only other symptom is that she states she has been feeling a little bit short of breath with exertion while walking through her house over the past couple days.  No cough, fevers, chills.  No urinary symptoms    History provided by:  Patient      Review of Systems    Past Medical History:   Diagnosis Date    Abnormality of rectum 06/2023    prolapse    Arthritis     Diabetes mellitus     Disease of thyroid gland     Hypertension     Kidney disease     Mental disorder     Spinal stenosis        Allergies   Allergen Reactions    Latex Rash, Unknown (See Comments) and Itching    Penicillins Rash, Unknown (See Comments) and Itching       No past surgical history on file.    Family History   Problem Relation Age of Onset    Hypertension Mother     Obesity Mother     Diabetes Father     Hypertension Father     Obesity Father     Obesity Sister     Hypersomnolence Sister     Obesity Brother     Sleep apnea Brother     Heart disease Maternal Aunt     Narcolepsy Maternal Uncle        Social History     Socioeconomic History    Marital status:    Tobacco Use    Smoking status: Former     Current packs/day: 0.00     Average " packs/day: 1 pack/day for 14.9 years (14.9 ttl pk-yrs)     Types: Cigarettes     Start date:      Quit date: 2007     Years since quittin.6     Passive exposure: Past    Smokeless tobacco: Never    Tobacco comments:     used nicotine lozenzes   Vaping Use    Vaping status: Never Used   Substance and Sexual Activity    Alcohol use: No    Drug use: No    Sexual activity: Defer           Objective   Physical Exam  Constitutional:       General: She is not in acute distress.  HENT:      Head: Normocephalic and atraumatic.   Eyes:      Conjunctiva/sclera: Conjunctivae normal.      Pupils: Pupils are equal, round, and reactive to light.   Cardiovascular:      Rate and Rhythm: Normal rate and regular rhythm.      Pulses: Normal pulses.      Heart sounds: No murmur heard.     No gallop.   Pulmonary:      Effort: Pulmonary effort is normal. No respiratory distress.      Breath sounds: No wheezing or rales.   Abdominal:      General: Abdomen is flat. There is no distension.      Tenderness: There is no abdominal tenderness.   Musculoskeletal:         General: No swelling or deformity. Normal range of motion.   Skin:     General: Skin is warm and dry.      Capillary Refill: Capillary refill takes less than 2 seconds.   Neurological:      General: No focal deficit present.      Mental Status: She is alert and oriented to person, place, and time.   Psychiatric:         Mood and Affect: Mood normal.         Behavior: Behavior normal.         Procedures           ED Course  ED Course as of 24 Chest x-ray independently interpreted by myself demonstrates no acute cardiopulmonary abnormality [KB]   0311 Laboratory workup independently interpreted by myself demonstrates no substantial abnormality [KB]      ED Course User Index  [KB] Williams Roa MD                                             Medical Decision Making  Differential diagnosis includes infection, accidental  unintentional overdose on insulin, other metabolic derangement.  Labs were obtained.    Patient observed for multiple hours in the ER without recurrence of hypoglycemia.  She is appropriate for outpatient management at this time.  Counseled on return precautions, close monitoring of blood sugar, discharged in good condition    Problems Addressed:  Hypoglycemia associated with diabetes: complicated acute illness or injury    Amount and/or Complexity of Data Reviewed  External Data Reviewed: notes.     Details: 2/13/2024 reviewed most recent ER visit where patient was treated for headache  Labs: ordered. Decision-making details documented in ED Course.  Radiology: ordered and independent interpretation performed. Decision-making details documented in ED Course.    Risk  Prescription drug management.  Decision regarding hospitalization.        Final diagnoses:   Hypoglycemia associated with diabetes       ED Disposition  ED Disposition       ED Disposition   Discharge    Condition   Stable    Comment   --             Recent Results (from the past 24 hour(s))   POC Glucose Once    Collection Time: 07/17/24  1:54 AM    Specimen: Blood   Result Value Ref Range    Glucose 106 70 - 130 mg/dL   POC Glucose Once    Collection Time: 07/17/24  2:09 AM    Specimen: Blood   Result Value Ref Range    Glucose 118 70 - 130 mg/dL   Comprehensive Metabolic Panel    Collection Time: 07/17/24  2:13 AM    Specimen: Blood   Result Value Ref Range    Glucose 114 (H) 65 - 99 mg/dL    BUN 18 6 - 20 mg/dL    Creatinine 1.24 (H) 0.57 - 1.00 mg/dL    Sodium 137 136 - 145 mmol/L    Potassium 4.0 3.5 - 5.2 mmol/L    Chloride 98 98 - 107 mmol/L    CO2 24.0 22.0 - 29.0 mmol/L    Calcium 9.9 8.6 - 10.5 mg/dL    Total Protein 7.3 6.0 - 8.5 g/dL    Albumin 4.5 3.5 - 5.2 g/dL    ALT (SGPT) 25 1 - 33 U/L    AST (SGOT) 33 (H) 1 - 32 U/L    Alkaline Phosphatase 100 39 - 117 U/L    Total Bilirubin 0.3 0.0 - 1.2 mg/dL    Globulin 2.8 gm/dL    A/G Ratio 1.6  g/dL    BUN/Creatinine Ratio 14.5 7.0 - 25.0    Anion Gap 15.0 5.0 - 15.0 mmol/L    eGFR 50.5 (L) >60.0 mL/min/1.73   BNP    Collection Time: 07/17/24  2:13 AM    Specimen: Blood   Result Value Ref Range    proBNP 47.5 0.0 - 900.0 pg/mL   Single High Sensitivity Troponin T    Collection Time: 07/17/24  2:13 AM    Specimen: Blood   Result Value Ref Range    HS Troponin T 13 <14 ng/L   Lactic Acid, Plasma    Collection Time: 07/17/24  2:13 AM    Specimen: Blood   Result Value Ref Range    Lactate 1.1 0.5 - 2.0 mmol/L   CBC Auto Differential    Collection Time: 07/17/24  2:13 AM    Specimen: Blood   Result Value Ref Range    WBC 6.74 3.40 - 10.80 10*3/mm3    RBC 4.97 3.77 - 5.28 10*6/mm3    Hemoglobin 14.6 12.0 - 15.9 g/dL    Hematocrit 44.5 34.0 - 46.6 %    MCV 89.5 79.0 - 97.0 fL    MCH 29.4 26.6 - 33.0 pg    MCHC 32.8 31.5 - 35.7 g/dL    RDW 13.3 12.3 - 15.4 %    RDW-SD 43.6 37.0 - 54.0 fl    MPV 10.3 6.0 - 12.0 fL    Platelets 229 140 - 450 10*3/mm3    Neutrophil % 60.4 42.7 - 76.0 %    Lymphocyte % 24.3 19.6 - 45.3 %    Monocyte % 11.0 5.0 - 12.0 %    Eosinophil % 3.1 0.3 - 6.2 %    Basophil % 0.9 0.0 - 1.5 %    Immature Grans % 0.3 0.0 - 0.5 %    Neutrophils, Absolute 4.07 1.70 - 7.00 10*3/mm3    Lymphocytes, Absolute 1.64 0.70 - 3.10 10*3/mm3    Monocytes, Absolute 0.74 0.10 - 0.90 10*3/mm3    Eosinophils, Absolute 0.21 0.00 - 0.40 10*3/mm3    Basophils, Absolute 0.06 0.00 - 0.20 10*3/mm3    Immature Grans, Absolute 0.02 0.00 - 0.05 10*3/mm3    nRBC 0.0 0.0 - 0.2 /100 WBC   Urinalysis With Microscopic If Indicated (No Culture) - Urine, Clean Catch    Collection Time: 07/17/24  2:20 AM    Specimen: Urine, Clean Catch   Result Value Ref Range    Color, UA Yellow Yellow, Straw    Appearance, UA Clear Clear    pH, UA 6.5 5.0 - 8.0    Specific Gravity, UA <=1.005 1.001 - 1.030    Glucose,  mg/dL (2+) (A) Negative    Ketones, UA Negative Negative    Bilirubin, UA Negative Negative    Blood, UA Negative Negative  "   Protein, UA Negative Negative    Leuk Esterase, UA Small (1+) (A) Negative    Nitrite, UA Negative Negative    Urobilinogen, UA 0.2 E.U./dL 0.2 - 1.0 E.U./dL   Urinalysis, Microscopic Only - Urine, Clean Catch    Collection Time: 07/17/24  2:20 AM    Specimen: Urine, Clean Catch   Result Value Ref Range    RBC, UA 0-2 None Seen, 0-2 /HPF    WBC, UA 0-2 None Seen, 0-2 /HPF    Bacteria, UA None Seen None Seen, Trace /HPF    Squamous Epithelial Cells, UA 0-2 None Seen, 0-2 /HPF    Hyaline Casts, UA None Seen 0 - 6 /LPF    Methodology Automated Microscopy      Note: In addition to lab results from this visit, the labs listed above may include labs taken at another facility or during a different encounter within the last 24 hours. Please correlate lab times with ED admission and discharge times for further clarification of the services performed during this visit.    XR Chest 1 View   Final Result   Impression:   No acute cardiopulmonary process.            Electronically Signed: Natalie Lui MD     7/17/2024 2:19 AM EDT     Workstation ID: [id number]        Vitals:    07/17/24 0153 07/17/24 0232 07/17/24 0300 07/17/24 0329   BP: (!) 158/101 133/80 127/92 109/72   BP Location: Right arm      Patient Position: Sitting      Pulse: 71 72 71 69   Resp: 18 18 18    Temp: 97.5 °F (36.4 °C)      TempSrc: Oral      SpO2: 96% 94% 93% 93%   Weight: 93.9 kg (207 lb)      Height: 170.2 cm (67\")        Medications   sodium chloride 0.9 % flush 10 mL (has no administration in time range)     ECG/EMG Results (last 24 hours)       ** No results found for the last 24 hours. **          No orders to display           No follow-up provider specified.       Medication List      No changes were made to your prescriptions during this visit.            Williams Roa MD  07/17/24 0432    "

## 2024-07-17 NOTE — DISCHARGE INSTRUCTIONS
Take your diabetes medications as prescribed and keep a close eye on your blood sugars over the next couple of days.  If your blood sugars are low drink a sugary beverage, crackers, peanut butter or anything with carbohydrates.  Follow-up with your primary care doctor or endocrinologist, whoever manages your diabetes.  Return to the ER as needed for new or worsening symptoms

## 2024-08-05 DIAGNOSIS — G47.33 OSA (OBSTRUCTIVE SLEEP APNEA): ICD-10-CM

## 2024-08-05 DIAGNOSIS — G47.34 NOCTURNAL HYPOXEMIA: ICD-10-CM

## 2024-08-07 ENCOUNTER — PATIENT ROUNDING (BHMG ONLY) (OUTPATIENT)
Dept: URGENT CARE | Facility: CLINIC | Age: 59
End: 2024-08-07
Payer: MEDICARE

## 2024-08-07 NOTE — ED NOTES
Thank you for letting us care for you in your recent visit to our urgent care center. We would love to hear about your experience with us. Was this the first time you have visited our location?    We’re always looking for ways to make our patients’ experiences even better. Do you have any recommendations on ways we may improve?     I appreciate you taking the time to respond. Please be on the lookout for a survey about your recent visit from [facility] staff via text or email. We would greatly appreciate if you could fill that out and turn it back in. We want your voice to be heard and we value your feedback.   Thank you for choosing [facility] for your healthcare needs.

## 2024-09-02 ENCOUNTER — TELEPHONE (OUTPATIENT)
Dept: URGENT CARE | Facility: CLINIC | Age: 59
End: 2024-09-02
Payer: MEDICARE

## 2024-09-02 NOTE — TELEPHONE ENCOUNTER
Pt called requesting medication to be sent to a different pharmacy. Her current pharmacy is closed for the holiday. Pt stated she wanted medication to go to [facility] on [address]. Informed the provided and informed pt we would get the prescription sent over.

## 2024-09-03 ENCOUNTER — HOSPITAL ENCOUNTER (EMERGENCY)
Facility: HOSPITAL | Age: 59
Discharge: HOME | End: 2024-09-03
Attending: EMERGENCY MEDICINE
Payer: MEDICARE

## 2024-09-03 ENCOUNTER — APPOINTMENT (OUTPATIENT)
Dept: GENERAL RADIOLOGY | Facility: HOSPITAL | Age: 59
End: 2024-09-03
Payer: MEDICARE

## 2024-09-03 VITALS
RESPIRATION RATE: 16 BRPM | SYSTOLIC BLOOD PRESSURE: 139 MMHG | WEIGHT: 210 LBS | TEMPERATURE: 98 F | HEIGHT: 67 IN | BODY MASS INDEX: 32.96 KG/M2 | HEART RATE: 69 BPM | DIASTOLIC BLOOD PRESSURE: 82 MMHG | OXYGEN SATURATION: 93 %

## 2024-09-03 VITALS
RESPIRATION RATE: 20 BRPM | BODY MASS INDEX: 32.96 KG/M2 | HEIGHT: 67 IN | HEART RATE: 64 BPM | DIASTOLIC BLOOD PRESSURE: 100 MMHG | SYSTOLIC BLOOD PRESSURE: 156 MMHG | OXYGEN SATURATION: 93 % | TEMPERATURE: 98.3 F | WEIGHT: 210 LBS

## 2024-09-03 DIAGNOSIS — R73.9 HYPERGLYCEMIA: Primary | ICD-10-CM

## 2024-09-03 DIAGNOSIS — R73.09 ABNORMAL BLOOD SUGAR: Primary | ICD-10-CM

## 2024-09-03 DIAGNOSIS — Z86.39 HISTORY OF DIABETES MELLITUS: ICD-10-CM

## 2024-09-03 DIAGNOSIS — N18.9 CHRONIC KIDNEY DISEASE, UNSPECIFIED CKD STAGE: ICD-10-CM

## 2024-09-03 LAB
ALBUMIN SERPL-MCNC: 4.4 G/DL (ref 3.5–5.2)
ALBUMIN SERPL-MCNC: 4.5 G/DL (ref 3.5–5.2)
ALBUMIN/GLOB SERPL: 1.7 G/DL
ALBUMIN/GLOB SERPL: 1.7 G/DL
ALP SERPL-CCNC: 123 U/L (ref 39–117)
ALP SERPL-CCNC: 129 U/L (ref 39–117)
ALT SERPL W P-5'-P-CCNC: 28 U/L (ref 1–33)
ALT SERPL W P-5'-P-CCNC: 29 U/L (ref 1–33)
ANION GAP SERPL CALCULATED.3IONS-SCNC: 11 MMOL/L (ref 5–15)
ANION GAP SERPL CALCULATED.3IONS-SCNC: 12 MMOL/L (ref 5–15)
AST SERPL-CCNC: 27 U/L (ref 1–32)
AST SERPL-CCNC: 29 U/L (ref 1–32)
B-OH-BUTYR SERPL-SCNC: 0.17 MMOL/L (ref 0.02–0.27)
BASOPHILS # BLD AUTO: 0.06 10*3/MM3 (ref 0–0.2)
BASOPHILS # BLD AUTO: 0.06 10*3/MM3 (ref 0–0.2)
BASOPHILS NFR BLD AUTO: 0.6 % (ref 0–1.5)
BASOPHILS NFR BLD AUTO: 0.8 % (ref 0–1.5)
BILIRUB SERPL-MCNC: 0.2 MG/DL (ref 0–1.2)
BILIRUB SERPL-MCNC: 0.2 MG/DL (ref 0–1.2)
BILIRUB UR QL STRIP: NEGATIVE
BILIRUB UR QL STRIP: NEGATIVE
BUN SERPL-MCNC: 18 MG/DL (ref 6–20)
BUN SERPL-MCNC: 20 MG/DL (ref 6–20)
BUN/CREAT SERPL: 15.9 (ref 7–25)
BUN/CREAT SERPL: 17.8 (ref 7–25)
CALCIUM SPEC-SCNC: 9.6 MG/DL (ref 8.6–10.5)
CALCIUM SPEC-SCNC: 9.7 MG/DL (ref 8.6–10.5)
CHLORIDE SERPL-SCNC: 101 MMOL/L (ref 98–107)
CHLORIDE SERPL-SCNC: 102 MMOL/L (ref 98–107)
CLARITY UR: CLEAR
CLARITY UR: CLEAR
CO2 SERPL-SCNC: 24 MMOL/L (ref 22–29)
CO2 SERPL-SCNC: 25 MMOL/L (ref 22–29)
COLOR UR: YELLOW
COLOR UR: YELLOW
CREAT SERPL-MCNC: 1.01 MG/DL (ref 0.57–1)
CREAT SERPL-MCNC: 1.26 MG/DL (ref 0.57–1)
DEPRECATED RDW RBC AUTO: 43.2 FL (ref 37–54)
DEPRECATED RDW RBC AUTO: 44.5 FL (ref 37–54)
EGFRCR SERPLBLD CKD-EPI 2021: 49.3 ML/MIN/1.73
EGFRCR SERPLBLD CKD-EPI 2021: 64.3 ML/MIN/1.73
EOSINOPHIL # BLD AUTO: 0.1 10*3/MM3 (ref 0–0.4)
EOSINOPHIL # BLD AUTO: 0.14 10*3/MM3 (ref 0–0.4)
EOSINOPHIL NFR BLD AUTO: 1.3 % (ref 0.3–6.2)
EOSINOPHIL NFR BLD AUTO: 1.3 % (ref 0.3–6.2)
ERYTHROCYTE [DISTWIDTH] IN BLOOD BY AUTOMATED COUNT: 13.3 % (ref 12.3–15.4)
ERYTHROCYTE [DISTWIDTH] IN BLOOD BY AUTOMATED COUNT: 13.4 % (ref 12.3–15.4)
GLOBULIN UR ELPH-MCNC: 2.6 GM/DL
GLOBULIN UR ELPH-MCNC: 2.7 GM/DL
GLUCOSE BLDC GLUCOMTR-MCNC: 157 MG/DL (ref 70–130)
GLUCOSE BLDC GLUCOMTR-MCNC: 275 MG/DL (ref 70–130)
GLUCOSE SERPL-MCNC: 117 MG/DL (ref 65–99)
GLUCOSE SERPL-MCNC: 234 MG/DL (ref 65–99)
GLUCOSE UR STRIP-MCNC: ABNORMAL MG/DL
GLUCOSE UR STRIP-MCNC: ABNORMAL MG/DL
HCT VFR BLD AUTO: 41.3 % (ref 34–46.6)
HCT VFR BLD AUTO: 41.4 % (ref 34–46.6)
HGB BLD-MCNC: 13.7 G/DL (ref 12–15.9)
HGB BLD-MCNC: 14 G/DL (ref 12–15.9)
HGB UR QL STRIP.AUTO: NEGATIVE
HGB UR QL STRIP.AUTO: NEGATIVE
HOLD SPECIMEN: NORMAL
IMM GRANULOCYTES # BLD AUTO: 0.05 10*3/MM3 (ref 0–0.05)
IMM GRANULOCYTES # BLD AUTO: 0.05 10*3/MM3 (ref 0–0.05)
IMM GRANULOCYTES NFR BLD AUTO: 0.5 % (ref 0–0.5)
IMM GRANULOCYTES NFR BLD AUTO: 0.6 % (ref 0–0.5)
KETONES UR QL STRIP: NEGATIVE
KETONES UR QL STRIP: NEGATIVE
LEUKOCYTE ESTERASE UR QL STRIP.AUTO: NEGATIVE
LEUKOCYTE ESTERASE UR QL STRIP.AUTO: NEGATIVE
LIPASE SERPL-CCNC: 48 U/L (ref 13–60)
LYMPHOCYTES # BLD AUTO: 1.09 10*3/MM3 (ref 0.7–3.1)
LYMPHOCYTES # BLD AUTO: 1.82 10*3/MM3 (ref 0.7–3.1)
LYMPHOCYTES NFR BLD AUTO: 14.1 % (ref 19.6–45.3)
LYMPHOCYTES NFR BLD AUTO: 17.1 % (ref 19.6–45.3)
MAGNESIUM SERPL-MCNC: 2.2 MG/DL (ref 1.6–2.6)
MCH RBC QN AUTO: 29.8 PG (ref 26.6–33)
MCH RBC QN AUTO: 30.1 PG (ref 26.6–33)
MCHC RBC AUTO-ENTMCNC: 33.2 G/DL (ref 31.5–35.7)
MCHC RBC AUTO-ENTMCNC: 33.8 G/DL (ref 31.5–35.7)
MCV RBC AUTO: 89 FL (ref 79–97)
MCV RBC AUTO: 89.8 FL (ref 79–97)
MONOCYTES # BLD AUTO: 0.56 10*3/MM3 (ref 0.1–0.9)
MONOCYTES # BLD AUTO: 0.71 10*3/MM3 (ref 0.1–0.9)
MONOCYTES NFR BLD AUTO: 6.7 % (ref 5–12)
MONOCYTES NFR BLD AUTO: 7.2 % (ref 5–12)
NEUTROPHILS NFR BLD AUTO: 5.87 10*3/MM3 (ref 1.7–7)
NEUTROPHILS NFR BLD AUTO: 7.85 10*3/MM3 (ref 1.7–7)
NEUTROPHILS NFR BLD AUTO: 73.8 % (ref 42.7–76)
NEUTROPHILS NFR BLD AUTO: 76 % (ref 42.7–76)
NITRITE UR QL STRIP: NEGATIVE
NITRITE UR QL STRIP: NEGATIVE
NRBC BLD AUTO-RTO: 0 /100 WBC (ref 0–0.2)
NRBC BLD AUTO-RTO: 0 /100 WBC (ref 0–0.2)
PH UR STRIP.AUTO: 5.5 [PH] (ref 5–8)
PH UR STRIP.AUTO: 6.5 [PH] (ref 5–8)
PLATELET # BLD AUTO: 225 10*3/MM3 (ref 140–450)
PLATELET # BLD AUTO: 234 10*3/MM3 (ref 140–450)
PMV BLD AUTO: 10.3 FL (ref 6–12)
PMV BLD AUTO: 10.5 FL (ref 6–12)
POTASSIUM SERPL-SCNC: 3.8 MMOL/L (ref 3.5–5.2)
POTASSIUM SERPL-SCNC: 4.2 MMOL/L (ref 3.5–5.2)
PROT SERPL-MCNC: 7 G/DL (ref 6–8.5)
PROT SERPL-MCNC: 7.2 G/DL (ref 6–8.5)
PROT UR QL STRIP: NEGATIVE
PROT UR QL STRIP: NEGATIVE
RBC # BLD AUTO: 4.6 10*6/MM3 (ref 3.77–5.28)
RBC # BLD AUTO: 4.65 10*6/MM3 (ref 3.77–5.28)
SODIUM SERPL-SCNC: 137 MMOL/L (ref 136–145)
SODIUM SERPL-SCNC: 138 MMOL/L (ref 136–145)
SP GR UR STRIP: 1.01 (ref 1–1.03)
SP GR UR STRIP: 1.01 (ref 1–1.03)
TROPONIN T SERPL HS-MCNC: 12 NG/L
UROBILINOGEN UR QL STRIP: ABNORMAL
UROBILINOGEN UR QL STRIP: ABNORMAL
WBC NRBC COR # BLD AUTO: 10.63 10*3/MM3 (ref 3.4–10.8)
WBC NRBC COR # BLD AUTO: 7.73 10*3/MM3 (ref 3.4–10.8)
WHOLE BLOOD HOLD COAG: NORMAL
WHOLE BLOOD HOLD SPECIMEN: NORMAL

## 2024-09-03 PROCEDURE — 83735 ASSAY OF MAGNESIUM: CPT | Performed by: EMERGENCY MEDICINE

## 2024-09-03 PROCEDURE — 93005 ELECTROCARDIOGRAM TRACING: CPT | Performed by: EMERGENCY MEDICINE

## 2024-09-03 PROCEDURE — 81003 URINALYSIS AUTO W/O SCOPE: CPT | Performed by: EMERGENCY MEDICINE

## 2024-09-03 PROCEDURE — 85025 COMPLETE CBC W/AUTO DIFF WBC: CPT | Performed by: EMERGENCY MEDICINE

## 2024-09-03 PROCEDURE — 82010 KETONE BODYS QUAN: CPT | Performed by: PHYSICIAN ASSISTANT

## 2024-09-03 PROCEDURE — 84484 ASSAY OF TROPONIN QUANT: CPT | Performed by: EMERGENCY MEDICINE

## 2024-09-03 PROCEDURE — 80053 COMPREHEN METABOLIC PANEL: CPT | Performed by: EMERGENCY MEDICINE

## 2024-09-03 PROCEDURE — 25810000003 SODIUM CHLORIDE 0.9 % SOLUTION: Performed by: EMERGENCY MEDICINE

## 2024-09-03 PROCEDURE — 93005 ELECTROCARDIOGRAM TRACING: CPT

## 2024-09-03 PROCEDURE — 71045 X-RAY EXAM CHEST 1 VIEW: CPT

## 2024-09-03 PROCEDURE — 99284 EMERGENCY DEPT VISIT MOD MDM: CPT

## 2024-09-03 PROCEDURE — 99283 EMERGENCY DEPT VISIT LOW MDM: CPT

## 2024-09-03 PROCEDURE — 82948 REAGENT STRIP/BLOOD GLUCOSE: CPT

## 2024-09-03 PROCEDURE — 83690 ASSAY OF LIPASE: CPT | Performed by: EMERGENCY MEDICINE

## 2024-09-03 RX ORDER — SODIUM CHLORIDE 0.9 % (FLUSH) 0.9 %
10 SYRINGE (ML) INJECTION AS NEEDED
Status: DISCONTINUED | OUTPATIENT
Start: 2024-09-03 | End: 2024-09-03 | Stop reason: HOSPADM

## 2024-09-03 RX ORDER — SODIUM CHLORIDE 0.9 % (FLUSH) 0.9 %
10 SYRINGE (ML) INJECTION AS NEEDED
Status: DISCONTINUED | OUTPATIENT
Start: 2024-09-03 | End: 2024-09-04 | Stop reason: HOSPADM

## 2024-09-03 RX ADMIN — SODIUM CHLORIDE 1000 ML: 9 INJECTION, SOLUTION INTRAVENOUS at 21:35

## 2024-09-04 NOTE — ED PROVIDER NOTES
EMERGENCY DEPARTMENT ENCOUNTER    Pt Name: Colette Hatch  MRN: [id number]  Pt :   1965  Room Number:    Date of encounter:  9/3/2024  PCP: Merle Charlton APRN  ED Provider: SHERYL Black    Historian: Patient    HPI:  Chief Complaint: Fluctuations in blood sugar    Context: Colette Hatch is a 59 y.o. female who presents to the ED c/o intermittent fluctuations in her blood sugar. Patient reports that throughout the day today patient has had elevations in her blood sugar. She reports symptoms of feeling dizzy, sluggish and at times confused. She has been following her blood sugar with her Dexcom and has had readings as high as 200. She has not had a fever. She reports no nausea, vomiting, diarrhea, constipation or any pain. She denies any urinary complaints. She reports no additional associated symptoms on exam.   HPI     REVIEW OF SYSTEMS  A chief complaint appropriate review of systems was completed and is negative except as noted in the HPI.     PAST MEDICAL HISTORY  Past Medical History:   Diagnosis Date    Abnormality of rectum 2023    prolapse    Arthritis     Diabetes mellitus     Disease of thyroid gland     Hypertension     Kidney disease     Mental disorder     Spinal stenosis        PAST SURGICAL HISTORY  No past surgical history on file.    FAMILY HISTORY  Family History   Problem Relation Age of Onset    Hypertension Mother     Obesity Mother     Diabetes Father     Hypertension Father     Obesity Father     Obesity Sister     Hypersomnolence Sister     Obesity Brother     Sleep apnea Brother     Heart disease Maternal Aunt     Narcolepsy Maternal Uncle        SOCIAL HISTORY  Social History     Socioeconomic History    Marital status:    Tobacco Use    Smoking status: Former     Current packs/day: 0.00     Average packs/day: 1 pack/day for 14.9 years (14.9 ttl pk-yrs)     Types: Cigarettes     Start date:      Quit date: 2007     Years since quittin.8      Passive exposure: Past    Smokeless tobacco: Never    Tobacco comments:     used nicotine lozenzes   Vaping Use    Vaping status: Never Used   Substance and Sexual Activity    Alcohol use: No    Drug use: No    Sexual activity: Defer       ALLERGIES  Latex and Penicillins    PHYSICAL EXAM  Physical Exam  GENERAL:   Appears in no acute distress.  HENT: Nares patent.  EYES: No scleral icterus.  CV: Regular rhythm, regular rate.  RESPIRATORY: Normal effort.  No audible wheezes, rales or rhonchi.  ABDOMEN: Soft, nontender  MUSCULOSKELETAL: No deformities.   NEURO: Alert, moves all extremities, follows commands.  SKIN: Warm, dry, no rash visualized.  PSYCH: Anxious  I have reviewed the triage vital signs and nursing notes.     LAB RESULTS  Results for orders placed or performed during the hospital encounter of 09/03/24   Comprehensive Metabolic Panel    Specimen: Blood   Result Value Ref Range    Glucose 117 (H) 65 - 99 mg/dL    BUN 18 6 - 20 mg/dL    Creatinine 1.01 (H) 0.57 - 1.00 mg/dL    Sodium 138 136 - 145 mmol/L    Potassium 4.2 3.5 - 5.2 mmol/L    Chloride 102 98 - 107 mmol/L    CO2 24.0 22.0 - 29.0 mmol/L    Calcium 9.7 8.6 - 10.5 mg/dL    Total Protein 7.2 6.0 - 8.5 g/dL    Albumin 4.5 3.5 - 5.2 g/dL    ALT (SGPT) 28 1 - 33 U/L    AST (SGOT) 29 1 - 32 U/L    Alkaline Phosphatase 129 (H) 39 - 117 U/L    Total Bilirubin 0.2 0.0 - 1.2 mg/dL    Globulin 2.7 gm/dL    A/G Ratio 1.7 g/dL    BUN/Creatinine Ratio 17.8 7.0 - 25.0    Anion Gap 12.0 5.0 - 15.0 mmol/L    eGFR 64.3 >60.0 mL/min/1.73   Single High Sensitivity Troponin T    Specimen: Blood   Result Value Ref Range    HS Troponin T 12 <14 ng/L   Magnesium    Specimen: Blood   Result Value Ref Range    Magnesium 2.2 1.6 - 2.6 mg/dL   Urinalysis With Microscopic If Indicated (No Culture) - Urine, Clean Catch    Specimen: Urine, Clean Catch   Result Value Ref Range    Color, UA Yellow Yellow, Straw    Appearance, UA Clear Clear    pH, UA 6.5 5.0 - 8.0    Specific  Gravity, UA 1.007 1.001 - 1.030    Glucose,  mg/dL (2+) (A) Negative    Ketones, UA Negative Negative    Bilirubin, UA Negative Negative    Blood, UA Negative Negative    Protein, UA Negative Negative    Leuk Esterase, UA Negative Negative    Nitrite, UA Negative Negative    Urobilinogen, UA 0.2 E.U./dL 0.2 - 1.0 E.U./dL   CBC Auto Differential    Specimen: Blood   Result Value Ref Range    WBC 7.73 3.40 - 10.80 10*3/mm3    RBC 4.65 3.77 - 5.28 10*6/mm3    Hemoglobin 14.0 12.0 - 15.9 g/dL    Hematocrit 41.4 34.0 - 46.6 %    MCV 89.0 79.0 - 97.0 fL    MCH 30.1 26.6 - 33.0 pg    MCHC 33.8 31.5 - 35.7 g/dL    RDW 13.3 12.3 - 15.4 %    RDW-SD 43.2 37.0 - 54.0 fl    MPV 10.3 6.0 - 12.0 fL    Platelets 234 140 - 450 10*3/mm3    Neutrophil % 76.0 42.7 - 76.0 %    Lymphocyte % 14.1 (L) 19.6 - 45.3 %    Monocyte % 7.2 5.0 - 12.0 %    Eosinophil % 1.3 0.3 - 6.2 %    Basophil % 0.8 0.0 - 1.5 %    Immature Grans % 0.6 (H) 0.0 - 0.5 %    Neutrophils, Absolute 5.87 1.70 - 7.00 10*3/mm3    Lymphocytes, Absolute 1.09 0.70 - 3.10 10*3/mm3    Monocytes, Absolute 0.56 0.10 - 0.90 10*3/mm3    Eosinophils, Absolute 0.10 0.00 - 0.40 10*3/mm3    Basophils, Absolute 0.06 0.00 - 0.20 10*3/mm3    Immature Grans, Absolute 0.05 0.00 - 0.05 10*3/mm3    nRBC 0.0 0.0 - 0.2 /100 WBC   Beta Hydroxybutyrate Quantitative    Specimen: Blood   Result Value Ref Range    Beta-Hydroxybutyrate Quant 0.167 0.020 - 0.270 mmol/L   POC Glucose Once    Specimen: Blood   Result Value Ref Range    Glucose 157 (H) 70 - 130 mg/dL   ECG 12 Lead ED Triage Standing Order; Weak / Dizzy / AMS   Result Value Ref Range    QT Interval 424 ms    QTC Interval 444 ms   Green Top (Gel)   Result Value Ref Range    Extra Tube Hold for add-ons.    Lavender Top   Result Value Ref Range    Extra Tube hold for add-on    Gold Top - SST   Result Value Ref Range    Extra Tube Hold for add-ons.    Gray Top   Result Value Ref Range    Extra Tube Hold for add-ons.    Light Blue Top    Result Value Ref Range    Extra Tube Hold for add-ons.        If labs were ordered, I independently reviewed the results and considered them in treating the patient.    RADIOLOGY  XR Chest 1 View   Final Result   Impression:   No active disease.            Electronically Signed: Ian Be MD     9/3/2024 3:08 AM EDT     Workstation ID: [id number]        [x] Radiologist's Report Reviewed:  I ordered and independently interpreted the above noted radiographic studies.  See radiologist's dictation for official interpretation.      PROCEDURES    Procedures    ECG 12 Lead ED Triage Standing Order; Weak / Dizzy / AMS   Preliminary Result   Test Reason : ED Triage Standing Order~   Blood Pressure :   */*   mmHG   Vent. Rate :  66 BPM     Atrial Rate :  66 BPM      P-R Int : 206 ms          QRS Dur :  78 ms       QT Int : 424 ms       P-R-T Axes :  33 -22  -7 degrees      QTc Int : 444 ms      Normal sinus rhythm   Normal ECG   No previous ECGs available      Referred By: EDMD           Confirmed By:           MEDICATIONS GIVEN IN ER    Medications - No data to display    MEDICAL DECISION MAKING, PROGRESS, and CONSULTS   Medical Decision Making  In summary, patient presents with apparent intermittent hyperglycemia. Considered DKA versus HHS, sepsis as possible etiologies of the patient's current presentation. However, given the current history & physical, including current glucose level, the current presentation is consistent with hyperglycemia associated with her diet. Instructed on continued symptomatic care, monitoring of carbohydrates in her diet and outpatient follow up to primary care. Clear return precautions provided.     Problems Addressed:  Abnormal blood sugar: complicated acute illness or injury    Amount and/or Complexity of Data Reviewed  Labs: ordered. Decision-making details documented in ED Course.  Radiology: ordered and independent interpretation performed. Decision-making details documented in ED  Course.  ECG/medicine tests: ordered and independent interpretation performed. Decision-making details documented in ED Course.      Discussion below represents my analysis of pertinent findings related to patient's condition, differential diagnosis, treatment plan and final disposition.    Differential diagnosis:  The differential diagnosis associated with the patient's presentation includes: DKA, HHS, sepsis     Additional sources  Discussed/ obtained information from independent historians:   [] Spouse  [] Parent  [] Family member  [] Friend  [] EMS   [] Other:  External (non-ED) record review:   [] Inpatient record:   [] Office record:   [] Outpatient record:   [] Prior Outpatient labs:   [] Prior Outpatient radiology:   [] Primary Care record:   [] Outside ED record:   [] Other:   Patient's care impacted by:   [x] Diabetes  [] Hypertension  [] Hyperlipidemia  [] Hypothyroidism   [] Coronary Artery Disease   [] COPD   [] Cancer   [x] Obesity  [] GERD   [] Tobacco Abuse   [] Substance Abuse    [] Anxiety   [] Depression   [x] Other: Sleep apnea   Care significantly affected by Social Determinants of Health (housing and economic circumstances, unemployment)    [] Yes     [x] No   If yes, Patient's care significantly limited by  Social Determinants of Health including:   [] Inadequate housing   [] Low income   [] Alcoholism and drug addiction in family   [] Problems related to primary support group   [] Unemployment   [] Problems related to employment   [] Other Social Determinants of Health:     Orders placed during this visit:  Orders Placed This Encounter   Procedures    XR Chest 1 View    [facility] Draw    Comprehensive Metabolic Panel    Single High Sensitivity Troponin T    Magnesium    Urinalysis With Microscopic If Indicated (No Culture) - Urine, Clean Catch    CBC Auto Differential    Beta Hydroxybutyrate Quantitative    Continuous Pulse Oximetry    Vital Signs    POC Glucose Once    POC Glucose Once    ECG  12 Lead ED Triage Standing Order; Weak / Dizzy / AMS    CBC & Differential    Green Top (Gel)    Lavender Top    Gold Top - SST    Gray Top    Light Blue Top    Ketone Bodies, Serum (Not performed at [facility])     ED Course:    ED Course as of 09/04/24 1701 Tue Sep 03, 2024   0251 Vitals and Telemetry tracing was reviewed and directly interpreted by myself demonstrating blood pressure 156/100, afebrile, heart rate 72, respirations of 20 breaths/min and oxygen saturation 94% room air [JG]   0252 BP: 156/100 [JG]   0252 Temp: 98.3 °F (36.8 °C) [JG]   0252 Heart Rate: 72 [JG]   0252 Resp: 20 [JG]   0252 SpO2: 94 % [JG]   0343 Imaging of chest personally interpreted by myself with official read provided by radiology demonstrated no acute cardiopulmonary process.   [JG]   0343 EKG personally interpreted by myself in addition to interpretation by attending without evidence of acute ischemic changes; normal sinus rhythm [JG]   0430 Labs studies were reviewed and directly interpreted by myself and demonstrated no acute or emergent abnormalities.   [JG]      ED Course User Index  [JG] Cristhian Tee, PA          DIAGNOSIS  Final diagnoses:   Abnormal blood sugar   History of diabetes mellitus     DISPOSITION    ED Disposition       ED Disposition   Discharge    Condition   Stable    Comment   --           DISCHARGE    Patient discharged in stable condition.    Reviewed implications of results, diagnosis, meds, responsibility to follow up, warning signs and symptoms of possible worsening, potential complications and reasons to return to ER.    Patient/Family voiced understanding of above instructions.    Discussed plan for discharge, as there is no emergent indication for admission.  Pt/family is agreeable and understands need for follow up and possible repeat testing.  Pt/family is aware that discharge does not mean that nothing is wrong but that it indicates no emergency is currently present that requires admission and  they must continue care with follow-up as given below or with a physician of their choice.     FOLLOW-UP  Merle Charlton, APRN  [address]  [address]  [place] [id number]  [phone]      Call for follow up with primary care    [facility] EMERGENCY DEPARTMENT  [address] [facility] [address] [phone]  Go to   If symptoms worsen         Medication List      No changes were made to your prescriptions during this visit.          Please note that portions of this document were completed with voice recognition software.        Cristhian Tee, PA  09/04/24 9402

## 2024-09-04 NOTE — ED PROVIDER NOTES
Subjective   History of Present Illness  This is a 59-year-old female with a history of diabetes presenting to the emergency department with some elevated blood sugar.  The patient states that she has had some elevated blood sugars for the last few days.  She has been taking her medications as directed.  However she has been eating a lot of foods that are rich in carbohydrates.  She is states that she has been eating like nothing has been wrong.  She is concerned that her blood sugar will not go down.  She denies any fevers or chills.  No headache or change in vision.  No focal weakness.  No chest pain or shortness of breath.  No abdominal pain or vomiting.  No diarrhea.    History provided by:  Patient   used: No        Review of Systems   Constitutional:  Negative for chills and fever.   HENT:  Negative for congestion, ear pain and sore throat.    Eyes:  Negative for visual disturbance.   Respiratory:  Negative for shortness of breath.    Cardiovascular:  Negative for chest pain.   Gastrointestinal:  Negative for abdominal pain.   Genitourinary:  Negative for difficulty urinating.   Musculoskeletal:  Negative for arthralgias.   Skin:  Negative for rash.   Neurological:  Negative for dizziness, weakness and numbness.   Psychiatric/Behavioral:  Negative for agitation.        Past Medical History:   Diagnosis Date    Abnormality of rectum 06/2023    prolapse    Arthritis     Diabetes mellitus     Disease of thyroid gland     Hypertension     Kidney disease     Mental disorder     Spinal stenosis        Allergies   Allergen Reactions    Latex Rash, Unknown (See Comments) and Itching    Penicillins Rash, Unknown (See Comments) and Itching       History reviewed. No pertinent surgical history.    Family History   Problem Relation Age of Onset    Hypertension Mother     Obesity Mother     Diabetes Father     Hypertension Father     Obesity Father     Obesity Sister     Hypersomnolence Sister      Obesity Brother     Sleep apnea Brother     Heart disease Maternal Aunt     Narcolepsy Maternal Uncle        Social History     Socioeconomic History    Marital status:    Tobacco Use    Smoking status: Former     Current packs/day: 0.00     Average packs/day: 1 pack/day for 14.9 years (14.9 ttl pk-yrs)     Types: Cigarettes     Start date:      Quit date: 2007     Years since quittin.8     Passive exposure: Past    Smokeless tobacco: Never    Tobacco comments:     used nicotine lozenzes   Vaping Use    Vaping status: Never Used   Substance and Sexual Activity    Alcohol use: No    Drug use: No    Sexual activity: Defer           Objective   Physical Exam  Vitals and nursing note reviewed.   Constitutional:       General: She is not in acute distress.     Appearance: She is not ill-appearing or toxic-appearing.   HENT:      Mouth/Throat:      Pharynx: No posterior oropharyngeal erythema.   Eyes:      Conjunctiva/sclera: Conjunctivae normal.      Pupils: Pupils are equal, round, and reactive to light.   Cardiovascular:      Rate and Rhythm: Normal rate and regular rhythm.   Pulmonary:      Effort: Pulmonary effort is normal. No respiratory distress.   Abdominal:      General: Abdomen is flat. There is no distension.      Palpations: There is no mass.      Tenderness: There is no abdominal tenderness. There is no guarding or rebound.   Musculoskeletal:         General: No deformity. Normal range of motion.   Skin:     General: Skin is warm.      Findings: No rash.   Neurological:      General: No focal deficit present.      Mental Status: She is alert and oriented to person, place, and time.      Motor: No weakness.         Procedures           ED Course  ED Course as of 09/03/24 2226   Tue Sep 03, 2024   2224 BP: 175/96 [JK]   2224 Temp: 97.9 °F (36.6 °C) [JK]   2224 Heart Rate: 76 [JK]   2224 Resp: 16 [JK]   222 SpO2: 94 %  Interpretation:  Patient's repeat vitals, telemetry tracing, and  pulse oximetry tracing were directly viewed and interpreted by myself.   O2 sat 94% on room air, interpreted as normal  Telemetry rhythm strip revealed a rate of 76 bpm, interpreted as normal sinus rhythm [JK]   2224 CBC & Differential(!) [JK]   2224 Comprehensive Metabolic Panel(!) [JK]   2224 Lipase [JK]   2225 Urinalysis With Microscopic If Indicated (No Culture) - Urine, Clean Catch(!)  Interpretation:  Laboratory studies were reviewed and interpreted directly by myself.  CBC was normal, CMP showed some mild elevation creatinine 1.26 and hyperglycemia of 234, lipase normal, urinalysis shows glucose [JK]   2225 On reevaluation, the patient's blood sugars decreasing.  We did have a long discussion about appropriate nutrition and use of her medications with underlying diabetes.  She is to follow-up with her PCP in 48 hours.  Given strict return precautions.  Verbalized understanding. [JK]   2225 I had a discussion with the patient/family regarding diagnosis, diagnostic results, treatment plan, and medications. The patient/family indicated understanding of these instructions. I spent adequate time at the bedside prior to discharge necessary to discuss the aftercare instructions, giving patient education, providing explanations of the results of our evaluations/findings, and my decision making to assure that the patient/family understand the plan of care. Time was allotted to answer questions at that time and throughout the ED course. Patient is required to maintain timely follow up, as discussed. I also discussed the potential for the development of an acute emergent condition requiring further evaluation, return to the ER, admission, or even surgical intervention.  I encouraged the patient to return to the emergency department immediately for any concerns, worsening symptoms, new complaints, or if symptoms persist and they are unable to seek follow-up in a timely fashion. The patient/family expressed understanding  and agreement with this plan    Shared decision making:   After full review of the patient's clinical presentation, review of any work-up including but not limited to laboratory studies and radiology obtained, I had a discussion with the patient.  Treatment options were discussed as well as the risks, benefits and consequences.  I discussed all findings with the patient and family members if available.  During the discussion, treatment goals were understood by all as well as any misconceptions which were addressed with the patient.  Ample time was given for any questions they may have had.  They are in agreement with the treatment plan as well as final disposition. [JK]      ED Course User Index  [JK] Gopi Meraz MD                                             Medical Decision Making  This is a 59-year-old female with a history of diabetes presenting to the emergency department for some elevated blood sugars.  This appears to be diet induced.  The patient does not have any deformities on physical exam.  There is no evidence consistent with DKA at this time.  Overall, the patient is nontoxic.  Afebrile.  IV access was established and the patient.  Placed on continuous telemetry monitoring.  Given the patient's presentation, differential is broad and will require further evaluation.  Workup initiated.      Differential diagnosis: Diabetes, hyperglycemia, acute kidney injury, electrolyte abnormality, DKA      Amount and/or Complexity of Data Reviewed  External Data Reviewed: labs and notes.     Details: External laboratories, imaging as well as notes were reviewed personally by myself.  All relevant studies were used to guide decision making.     Date of previous record: 8/14/2024    Source of note: Nephrology    Summary: Patient was seen evaluate for chronic kidney disease.  I did review basic laboratory studies on file.  Records reviewed    Labs: ordered. Decision-making details documented in ED  Course.    Risk  Prescription drug management.        Final diagnoses:   Hyperglycemia   Chronic kidney disease, unspecified CKD stage       ED Disposition  ED Disposition       ED Disposition   Discharge    Condition   Stable    Comment   --               Merle Charlton, APRN  [address] [address]  [phone]    Call in 1 day           Medication List      No changes were made to your prescriptions during this visit.            Gopi Meraz MD  09/03/24 8461

## 2024-09-05 LAB
QT INTERVAL: 424 MS
QTC INTERVAL: 444 MS

## 2024-09-29 ENCOUNTER — APPOINTMENT (OUTPATIENT)
Dept: CT IMAGING | Facility: HOSPITAL | Age: 59
End: 2024-09-29
Payer: MEDICARE

## 2024-09-29 ENCOUNTER — HOSPITAL ENCOUNTER (EMERGENCY)
Facility: HOSPITAL | Age: 59
Discharge: HOME | End: 2024-09-29
Attending: EMERGENCY MEDICINE | Admitting: EMERGENCY MEDICINE
Payer: MEDICARE

## 2024-09-29 VITALS
HEIGHT: 67 IN | RESPIRATION RATE: 20 BRPM | WEIGHT: 210 LBS | DIASTOLIC BLOOD PRESSURE: 87 MMHG | HEART RATE: 73 BPM | BODY MASS INDEX: 32.96 KG/M2 | OXYGEN SATURATION: 91 % | SYSTOLIC BLOOD PRESSURE: 118 MMHG | TEMPERATURE: 97.8 F

## 2024-09-29 DIAGNOSIS — K75.3 HEPATIC GRANULOMA: ICD-10-CM

## 2024-09-29 DIAGNOSIS — R91.8 LUNG NODULES: ICD-10-CM

## 2024-09-29 DIAGNOSIS — R10.9 FLANK PAIN: Primary | ICD-10-CM

## 2024-09-29 LAB
ALBUMIN SERPL-MCNC: 4.7 G/DL (ref 3.5–5.2)
ALBUMIN/GLOB SERPL: 1.7 G/DL
ALP SERPL-CCNC: 100 U/L (ref 39–117)
ALT SERPL W P-5'-P-CCNC: 30 U/L (ref 1–33)
ANION GAP SERPL CALCULATED.3IONS-SCNC: 12 MMOL/L (ref 5–15)
AST SERPL-CCNC: 33 U/L (ref 1–32)
BASOPHILS # BLD AUTO: 0.03 10*3/MM3 (ref 0–0.2)
BASOPHILS NFR BLD AUTO: 0.5 % (ref 0–1.5)
BILIRUB SERPL-MCNC: 0.4 MG/DL (ref 0–1.2)
BILIRUB UR QL STRIP: NEGATIVE
BUN SERPL-MCNC: 17 MG/DL (ref 6–20)
BUN/CREAT SERPL: 14 (ref 7–25)
CALCIUM SPEC-SCNC: 9.9 MG/DL (ref 8.6–10.5)
CHLORIDE SERPL-SCNC: 103 MMOL/L (ref 98–107)
CLARITY UR: CLEAR
CO2 SERPL-SCNC: 23 MMOL/L (ref 22–29)
COLOR UR: YELLOW
CREAT SERPL-MCNC: 1.21 MG/DL (ref 0.57–1)
D-LACTATE SERPL-SCNC: 1.3 MMOL/L (ref 0.5–2)
DEPRECATED RDW RBC AUTO: 43.9 FL (ref 37–54)
EGFRCR SERPLBLD CKD-EPI 2021: 51.7 ML/MIN/1.73
EOSINOPHIL # BLD AUTO: 0.12 10*3/MM3 (ref 0–0.4)
EOSINOPHIL NFR BLD AUTO: 1.9 % (ref 0.3–6.2)
ERYTHROCYTE [DISTWIDTH] IN BLOOD BY AUTOMATED COUNT: 13.4 % (ref 12.3–15.4)
GLOBULIN UR ELPH-MCNC: 2.7 GM/DL
GLUCOSE SERPL-MCNC: 157 MG/DL (ref 65–99)
GLUCOSE UR STRIP-MCNC: ABNORMAL MG/DL
HCT VFR BLD AUTO: 43.5 % (ref 34–46.6)
HGB BLD-MCNC: 14.5 G/DL (ref 12–15.9)
HGB UR QL STRIP.AUTO: NEGATIVE
HOLD SPECIMEN: NORMAL
IMM GRANULOCYTES # BLD AUTO: 0.02 10*3/MM3 (ref 0–0.05)
IMM GRANULOCYTES NFR BLD AUTO: 0.3 % (ref 0–0.5)
KETONES UR QL STRIP: NEGATIVE
LEUKOCYTE ESTERASE UR QL STRIP.AUTO: NEGATIVE
LIPASE SERPL-CCNC: 37 U/L (ref 13–60)
LYMPHOCYTES # BLD AUTO: 1 10*3/MM3 (ref 0.7–3.1)
LYMPHOCYTES NFR BLD AUTO: 16.1 % (ref 19.6–45.3)
MCH RBC QN AUTO: 29.6 PG (ref 26.6–33)
MCHC RBC AUTO-ENTMCNC: 33.3 G/DL (ref 31.5–35.7)
MCV RBC AUTO: 88.8 FL (ref 79–97)
MONOCYTES # BLD AUTO: 0.81 10*3/MM3 (ref 0.1–0.9)
MONOCYTES NFR BLD AUTO: 13.1 % (ref 5–12)
NEUTROPHILS NFR BLD AUTO: 4.22 10*3/MM3 (ref 1.7–7)
NEUTROPHILS NFR BLD AUTO: 68.1 % (ref 42.7–76)
NITRITE UR QL STRIP: NEGATIVE
NRBC BLD AUTO-RTO: 0 /100 WBC (ref 0–0.2)
PH UR STRIP.AUTO: 6 [PH] (ref 5–8)
PLATELET # BLD AUTO: 204 10*3/MM3 (ref 140–450)
PMV BLD AUTO: 10.1 FL (ref 6–12)
POTASSIUM SERPL-SCNC: 3.8 MMOL/L (ref 3.5–5.2)
PROT SERPL-MCNC: 7.4 G/DL (ref 6–8.5)
PROT UR QL STRIP: NEGATIVE
RBC # BLD AUTO: 4.9 10*6/MM3 (ref 3.77–5.28)
SODIUM SERPL-SCNC: 138 MMOL/L (ref 136–145)
SP GR UR STRIP: 1.01 (ref 1–1.03)
UROBILINOGEN UR QL STRIP: ABNORMAL
WBC NRBC COR # BLD AUTO: 6.2 10*3/MM3 (ref 3.4–10.8)
WHOLE BLOOD HOLD COAG: NORMAL
WHOLE BLOOD HOLD SPECIMEN: NORMAL

## 2024-09-29 PROCEDURE — 99284 EMERGENCY DEPT VISIT MOD MDM: CPT

## 2024-09-29 PROCEDURE — 83690 ASSAY OF LIPASE: CPT | Performed by: EMERGENCY MEDICINE

## 2024-09-29 PROCEDURE — 83605 ASSAY OF LACTIC ACID: CPT | Performed by: EMERGENCY MEDICINE

## 2024-09-29 PROCEDURE — 80053 COMPREHEN METABOLIC PANEL: CPT | Performed by: EMERGENCY MEDICINE

## 2024-09-29 PROCEDURE — 74176 CT ABD & PELVIS W/O CONTRAST: CPT

## 2024-09-29 PROCEDURE — 85025 COMPLETE CBC W/AUTO DIFF WBC: CPT | Performed by: EMERGENCY MEDICINE

## 2024-09-29 PROCEDURE — 81003 URINALYSIS AUTO W/O SCOPE: CPT | Performed by: EMERGENCY MEDICINE

## 2024-09-29 RX ORDER — SODIUM CHLORIDE 9 MG/ML
10 INJECTION, SOLUTION INTRAMUSCULAR; INTRAVENOUS; SUBCUTANEOUS AS NEEDED
Status: DISCONTINUED | OUTPATIENT
Start: 2024-09-29 | End: 2024-09-29 | Stop reason: HOSPADM

## 2024-09-29 NOTE — ED PROVIDER NOTES
EMERGENCY DEPARTMENT ENCOUNTER    Pt Name: Colette Hatch  MRN: [id number]  Pt :   1965  Room Number:    Date of encounter:  2024  PCP: Merle Charlton APRN  ED Provider: ELVA Hare    Historian: Patient      HPI:  Chief Complaint: Flank pain        Context: Colette Hatch is a 59 y.o. female who presents to the ED c/o right flank pain since 2 months ago.  Pain is intermittent, moderate, radiating to the right mid abdomen.  Denies dysuria, hematuria, fever.  Was evaluated by PCP with outpatient CT ordered.  States CT is still not performed.  This morning she had worsening of abdominal pain that she initially attributed to gas pain.  She had 1 episode of diarrhea today that is not unusual for her, takes Linzess.  Denies chest pain, shortness of breath, fever.      PAST MEDICAL HISTORY  Past Medical History:   Diagnosis Date    Abnormality of rectum 2023    prolapse    Arthritis     Diabetes mellitus     Disease of thyroid gland     Hypertension     Kidney disease     Mental disorder     Spinal stenosis          PAST SURGICAL HISTORY  History reviewed. No pertinent surgical history.      FAMILY HISTORY  Family History   Problem Relation Age of Onset    Hypertension Mother     Obesity Mother     Diabetes Father     Hypertension Father     Obesity Father     Obesity Sister     Hypersomnolence Sister     Obesity Brother     Sleep apnea Brother     Heart disease Maternal Aunt     Narcolepsy Maternal Uncle          SOCIAL HISTORY  Social History     Socioeconomic History    Marital status:    Tobacco Use    Smoking status: Former     Current packs/day: 0.00     Average packs/day: 1 pack/day for 14.9 years (14.9 ttl pk-yrs)     Types: Cigarettes     Start date:      Quit date: 2007     Years since quittin.8     Passive exposure: Past    Smokeless tobacco: Never    Tobacco comments:     used nicotine lozenzes   Vaping Use    Vaping status: Never Used   Substance and  Sexual Activity    Alcohol use: No    Drug use: No    Sexual activity: Defer         ALLERGIES  Latex and Penicillins        REVIEW OF SYSTEMS  Review of Systems       All systems reviewed and negative except for those discussed in HPI.       PHYSICAL EXAM    I have reviewed the triage vital signs and nursing notes.    ED Triage Vitals [09/29/24 1005]   Temp Heart Rate Resp BP SpO2   97.8 °F (36.6 °C) 81 20 145/81 97 %      Temp src Heart Rate Source Patient Position BP Location FiO2 (%)   Oral Monitor Sitting Left arm --       Physical Exam  GENERAL:   Appears in no acute distress.  Obese  HENT: Nares patent.  EYES: No scleral icterus.  CV: Regular rhythm, regular rate.  RESPIRATORY: Normal effort.  No audible wheezes, rales or rhonchi.  ABDOMEN: Soft, nontender  MUSCULOSKELETAL: No deformities.  Mild tenderness on palpation mid lumbar spine, mild right CVA tenderness  NEURO: Alert, moves all extremities, follows commands.  SKIN: Warm, dry, no rash visualized.      LAB RESULTS  Recent Results (from the past 24 hour(s))   Urinalysis With Microscopic If Indicated (No Culture) - Urine, Clean Catch    Collection Time: 09/29/24 10:33 AM    Specimen: Urine, Clean Catch   Result Value Ref Range    Color, UA Yellow Yellow, Straw    Appearance, UA Clear Clear    pH, UA 6.0 5.0 - 8.0    Specific Gravity, UA 1.007 1.001 - 1.030    Glucose,  mg/dL (2+) (A) Negative    Ketones, UA Negative Negative    Bilirubin, UA Negative Negative    Blood, UA Negative Negative    Protein, UA Negative Negative    Leuk Esterase, UA Negative Negative    Nitrite, UA Negative Negative    Urobilinogen, UA 0.2 E.U./dL 0.2 - 1.0 E.U./dL   Comprehensive Metabolic Panel    Collection Time: 09/29/24 10:41 AM    Specimen: Blood   Result Value Ref Range    Glucose 157 (H) 65 - 99 mg/dL    BUN 17 6 - 20 mg/dL    Creatinine 1.21 (H) 0.57 - 1.00 mg/dL    Sodium 138 136 - 145 mmol/L    Potassium 3.8 3.5 - 5.2 mmol/L    Chloride 103 98 - 107 mmol/L     CO2 23.0 22.0 - 29.0 mmol/L    Calcium 9.9 8.6 - 10.5 mg/dL    Total Protein 7.4 6.0 - 8.5 g/dL    Albumin 4.7 3.5 - 5.2 g/dL    ALT (SGPT) 30 1 - 33 U/L    AST (SGOT) 33 (H) 1 - 32 U/L    Alkaline Phosphatase 100 39 - 117 U/L    Total Bilirubin 0.4 0.0 - 1.2 mg/dL    Globulin 2.7 gm/dL    A/G Ratio 1.7 g/dL    BUN/Creatinine Ratio 14.0 7.0 - 25.0    Anion Gap 12.0 5.0 - 15.0 mmol/L    eGFR 51.7 (L) >60.0 mL/min/1.73   Lipase    Collection Time: 09/29/24 10:41 AM    Specimen: Blood   Result Value Ref Range    Lipase 37 13 - 60 U/L   Lactic Acid, Plasma    Collection Time: 09/29/24 10:41 AM    Specimen: Blood   Result Value Ref Range    Lactate 1.3 0.5 - 2.0 mmol/L   Green Top (Gel)    Collection Time: 09/29/24 10:41 AM   Result Value Ref Range    Extra Tube Hold for add-ons.    Lavender Top    Collection Time: 09/29/24 10:41 AM   Result Value Ref Range    Extra Tube hold for add-on    Gold Top - SST    Collection Time: 09/29/24 10:41 AM   Result Value Ref Range    Extra Tube Hold for add-ons.    Gray Top    Collection Time: 09/29/24 10:41 AM   Result Value Ref Range    Extra Tube Hold for add-ons.    Light Blue Top    Collection Time: 09/29/24 10:41 AM   Result Value Ref Range    Extra Tube Hold for add-ons.    CBC Auto Differential    Collection Time: 09/29/24 10:41 AM    Specimen: Blood   Result Value Ref Range    WBC 6.20 3.40 - 10.80 10*3/mm3    RBC 4.90 3.77 - 5.28 10*6/mm3    Hemoglobin 14.5 12.0 - 15.9 g/dL    Hematocrit 43.5 34.0 - 46.6 %    MCV 88.8 79.0 - 97.0 fL    MCH 29.6 26.6 - 33.0 pg    MCHC 33.3 31.5 - 35.7 g/dL    RDW 13.4 12.3 - 15.4 %    RDW-SD 43.9 37.0 - 54.0 fl    MPV 10.1 6.0 - 12.0 fL    Platelets 204 140 - 450 10*3/mm3    Neutrophil % 68.1 42.7 - 76.0 %    Lymphocyte % 16.1 (L) 19.6 - 45.3 %    Monocyte % 13.1 (H) 5.0 - 12.0 %    Eosinophil % 1.9 0.3 - 6.2 %    Basophil % 0.5 0.0 - 1.5 %    Immature Grans % 0.3 0.0 - 0.5 %    Neutrophils, Absolute 4.22 1.70 - 7.00 10*3/mm3    Lymphocytes,  Absolute 1.00 0.70 - 3.10 10*3/mm3    Monocytes, Absolute 0.81 0.10 - 0.90 10*3/mm3    Eosinophils, Absolute 0.12 0.00 - 0.40 10*3/mm3    Basophils, Absolute 0.03 0.00 - 0.20 10*3/mm3    Immature Grans, Absolute 0.02 0.00 - 0.05 10*3/mm3    nRBC 0.0 0.0 - 0.2 /100 WBC       If labs were ordered, I independently reviewed the results and considered them in treating the patient.        RADIOLOGY  CT Abdomen Pelvis Without Contrast    Result Date: 9/29/2024  DATE OF EXAM: 9/29/2024 11:24 AM  PROCEDURE: CT ABDOMEN PELVIS WO CONTRAST-  INDICATIONS: flank pain  COMPARISON: No comparisons available.  TECHNIQUE: Routine transaxial slices were obtained through the abdomen and pelvis without the administration of intravenous contrast. Reconstructed coronal and sagittal images were also obtained. Automated exposure control and iterative construction methods were used.  The radiation dose reduction device was turned on for each scan per the ALARA (As Low as Reasonably Achievable) protocol.  FINDINGS: Visualized lung bases demonstrate scattered benign calcified granulomas. No focal consolidation.  Normal morphology of the liver. Scattered calcified hepatic granulomata. No focal liver lesions otherwise on this noncontrast exam. Gallbladder is unremarkable. No findings of acute pancreatitis. Spleen is normal in size with calcified splenic granulomata.  No adrenal nodule. Kidneys are symmetric in size. No hydronephrosis. No nephroureterolithiasis. Urinary bladder is unremarkable. Reproductive organs are unremarkable. Visualized portions of the gastrointestinal tract are normal in course and caliber without evidence of bowel obstruction. Normal appendix.  No suspicious lymphadenopathy. Scattered atherosclerotic vascular calcifications. No abdominal aortic aneurysm. No free fluid. No organized fluid collection. No free air.  No soft tissue abnormality. Degenerative changes of the lumbar spine with trace/grade 1 anterolisthesis of L4  on L5. No acute or suspicious osseous lesions.      No acute findings in the abdomen or pelvis on this noncontrast exam. No nephroureterolithiasis.  Ancillary findings as above.   This report was finalized on 9/29/2024 11:51 AM by Fred Carbajal MD.       I ordered and independently reviewed the above noted radiographic studies.          PROCEDURES    Procedures    No orders to display       MEDICATIONS GIVEN IN ER    Medications - No data to display        MEDICAL DECISION MAKING, PROGRESS, and CONSULTS    All labs, if obtained, have been independently reviewed by me.  All radiology studies, if obtained, have been reviewed by me and the radiologist dictating the report.  All EKG's, if obtained, have been independently viewed and interpreted by me/my attending physician.      Discussion below represents my analysis of pertinent findings related to patient's condition, differential diagnosis, treatment plan and final disposition.  Patient is 59-year-old female who presents for evaluation of right flank pain since 2 months ago.  On physical exam she was nontoxic-appearing, lab work was obtained significant for hyperglycemia with glucose 157 and mildly elevated creatinine 1.21, normal white count stable hemoglobin hematocrit, no UTI, CT of the abdomen pelvis without contrast was obtained showing no acute findings, no nephroureterolithiasis,                 Degenerative changes to lumbar spine with grade 1 anterolisthesis noted, lung bases demonstrate calcified granulomas, liver demonstrates hepatic granulomata.  Patient notified of findings.  Advised follow-up with PCP for further evaluation, management of the symptoms.  Discussed return precaution for any new worsening.      Differential diagnosis:  Flank pain, musculoskeletal pain, renal calculi, hydronephrosis, hydroureter, UTI        Additional sources:    - Discussed/ obtained information from independent historians:      - External (non-ED) record review:      -  Chronic or social conditions impacting care: Chronic constipation, GI visit September 11, similar complaint of flank pain, abdominal pain.    - Shared decision making: Patient      Orders placed during this visit:  Orders Placed This Encounter   Procedures    CT Abdomen Pelvis Without Contrast    [place] Draw    Comprehensive Metabolic Panel    Lipase    Urinalysis With Microscopic If Indicated (No Culture) - Urine, Clean Catch    Lactic Acid, Plasma    CBC Auto Differential    Undress & Gown    CBC & Differential    Green Top (Gel)    Lavender Top    Gold Top - SST    Gray Top    Light Blue Top         Additional orders considered but not ordered:  CT chest    ED Course:    Consultants:                  Shared Decision Making:  After my consideration of clinical presentation and any laboratory/radiology studies obtained, I discussed the findings with the patient/patient representative who is in agreement with the treatment plan and the final disposition.   Risks and benefits of discharge and/or observation/admission were discussed.       AS OF 16:41 EDT VITALS:    BP - 118/87  HR - 73  TEMP - 97.8 °F (36.6 °C) (Oral)  O2 SATS - 91%                  DIAGNOSIS  Final diagnoses:   Flank pain   Lung nodules   Hepatic granuloma         DISPOSITION  DISCHARGE    Patient discharged in stable condition.    Reviewed implications of results, diagnosis, meds, responsibility to follow up, warning signs and symptoms of possible worsening, potential complications and reasons to return to ER.    Patient/Family voiced understanding of above instructions.    Discussed plan for discharge, as there is no emergent indication for admission.  Pt/family is agreeable and understands need for follow up and possible repeat testing.  Pt/family is aware that discharge does not mean that nothing is wrong but that it indicates no emergency is currently present that requires admission and they must continue care with follow-up as given below or  with a physician of their choice.     FOLLOW-UP  Merle Charlton, APRN  [address]   [address]  [facility] [address]  [phone]          [facility] EMERGENCY DEPARTMENT  [address] [facility] [address] [phone]             Medication List      No changes were made to your prescriptions during this visit.            Please note that portions of this document were completed with voice recognition software.     ELVA Hare   09/29/24   16:41 EDT        Becky Mars, [email or web address]  09/29/24 1648

## 2024-12-11 PROCEDURE — 87086 URINE CULTURE/COLONY COUNT: CPT | Performed by: FAMILY MEDICINE

## 2024-12-11 PROCEDURE — 87147 CULTURE TYPE IMMUNOLOGIC: CPT | Performed by: FAMILY MEDICINE

## 2024-12-16 PROCEDURE — 87086 URINE CULTURE/COLONY COUNT: CPT | Performed by: FAMILY MEDICINE

## 2025-01-20 ENCOUNTER — TRANSCRIBE ORDERS (OUTPATIENT)
Dept: ADMINISTRATIVE | Facility: HOSPITAL | Age: 60
End: 2025-01-20
Payer: MEDICARE

## 2025-01-20 DIAGNOSIS — R91.1 LUNG NODULE: Primary | ICD-10-CM

## 2025-01-30 ENCOUNTER — HOSPITAL ENCOUNTER (OUTPATIENT)
Dept: CT IMAGING | Facility: HOSPITAL | Age: 60
Discharge: HOME | End: 2025-01-30
Admitting: INTERNAL MEDICINE
Payer: MEDICARE

## 2025-01-30 DIAGNOSIS — R91.1 LUNG NODULE: ICD-10-CM

## 2025-01-30 PROCEDURE — 71250 CT THORAX DX C-: CPT

## 2025-03-25 ENCOUNTER — OFFICE VISIT (OUTPATIENT)
Dept: SLEEP MEDICINE | Facility: CLINIC | Age: 60
End: 2025-03-25
Payer: MEDICARE

## 2025-03-25 VITALS
TEMPERATURE: 97.3 F | HEIGHT: 67 IN | HEART RATE: 68 BPM | SYSTOLIC BLOOD PRESSURE: 120 MMHG | BODY MASS INDEX: 33.59 KG/M2 | WEIGHT: 214 LBS | DIASTOLIC BLOOD PRESSURE: 78 MMHG | OXYGEN SATURATION: 97 %

## 2025-03-25 DIAGNOSIS — G47.33 OSA (OBSTRUCTIVE SLEEP APNEA): Primary | ICD-10-CM

## 2025-03-25 DIAGNOSIS — F51.04 PSYCHOPHYSIOLOGICAL INSOMNIA: ICD-10-CM

## 2025-03-25 PROCEDURE — 99213 OFFICE O/P EST LOW 20 MIN: CPT | Performed by: NURSE PRACTITIONER

## 2025-03-25 NOTE — PROGRESS NOTES
Chief Complaint:   Chief Complaint   Patient presents with    Follow-up    Sleep Apnea       HPI:    Colette Hatch is a 59 y.o. female here for follow-up of sleep apnea.  Patient was last seen 3/26/2024.  Patient continues to do well with CPAP therapy.  Patient is sleeping 6 to 8 hours nightly.  Patient goes to sleep quickly and does not get up during the night.  Patient has an Morton score of 6/24.  Patient does take trazodone nightly per PCP.  She has no concerns or complaints regarding CPAP and wishes to continue therapy.        Current medications are:   Current Outpatient Medications:     albuterol sulfate  (90 Base) MCG/ACT inhaler, inhale 1 puff by mouth every 4 hours as needed, Disp: , Rfl:     B-D ULTRAFINE III SHORT PEN 31G X 8 MM misc, , Disp: , Rfl:     Blood Glucose Monitoring Suppl (TRUE METRIX METER) w/Device kit, 4 (Four) Times a Day. as directed, Disp: , Rfl: 0    Continuous Glucose Sensor (FreeStyle Tequila 2 Sensor) misc, USE TO CONTINUOUSLY        MONITOR BLOOD GLUCOSE, Disp: , Rfl:     Diclofenac Sodium (VOLTAREN) 1 % gel gel, , Disp: , Rfl:     Dulaglutide 4.5 MG/0.5ML solution pen-injector, , Disp: , Rfl:     estradiol (ESTRACE) 0.1 MG/GM vaginal cream, INSERT 1 GRAM INTO THE VAGINA 3 TIMES PER WEEK, Disp: , Rfl:     Farxiga 10 MG tablet, Take 10 mg by mouth Daily., Disp: , Rfl:     FLUoxetine (PROzac) 40 MG capsule, , Disp: , Rfl:     gabapentin (NEURONTIN) 300 MG capsule, Take 1 capsule by mouth 3 (Three) Times a Day., Disp: , Rfl:     glucose blood test strip, , Disp: , Rfl:     ipratropium (ATROVENT) 0.06 % nasal spray, , Disp: , Rfl:     lamoTRIgine (LaMICtal) 200 MG tablet, , Disp: , Rfl:     Lantus SoloStar 100 UNIT/ML injection pen, , Disp: , Rfl:     levothyroxine (SYNTHROID, LEVOTHROID) 25 MCG tablet, , Disp: , Rfl:     linaclotide (LINZESS) 145 MCG capsule capsule, Take 1 capsule by mouth., Disp: , Rfl:     losartan (COZAAR) 50 MG tablet, , Disp: , Rfl:     metFORMIN  (GLUCOPHAGE) 500 MG tablet, Take 1 tablet by mouth 2 (Two) Times a Day With Meals., Disp: , Rfl: 0    methenamine (HIPREX) 1 g tablet, , Disp: , Rfl:     omeprazole (priLOSEC) 40 MG capsule, , Disp: , Rfl:     pravastatin (PRAVACHOL) 40 MG tablet, , Disp: , Rfl:     risperiDONE (risperDAL) 1 MG tablet, , Disp: , Rfl:     risperiDONE (risperDAL) 2 MG tablet, TAKE 1/2 TABLET BY MOUTH FOUR TIMES DAILY, Disp: , Rfl:     traZODone (DESYREL) 50 MG tablet, TAKE 1/2 TO 1 TABLET BY MOUTH EVERY DAY AT BEDTIME AS NEEDED, Disp: , Rfl:     TRUEPLUS LANCETS 33G misc, 4 (Four) Times a Day. as directed, Disp: , Rfl: 1.      The patient's relevant past medical, surgical, family and social history were reviewed and updated in Epic as appropriate.       Review of Systems   HENT:  Positive for hearing loss and tinnitus.    Eyes:  Positive for visual disturbance.   Respiratory:  Positive for apnea.    Cardiovascular:  Positive for chest pain.   Gastrointestinal:  Positive for diarrhea.        Heartburn   Musculoskeletal:  Positive for arthralgias, back pain and joint swelling.   Neurological:  Positive for weakness, light-headedness and numbness.   Psychiatric/Behavioral:  Positive for decreased concentration, dysphoric mood and sleep disturbance. The patient is nervous/anxious.    All other systems reviewed and are negative.        Objective:    Physical Exam  Constitutional:       Appearance: Normal appearance.   HENT:      Head: Normocephalic and atraumatic.      Mouth/Throat:      Comments: Class 4 airway  Cardiovascular:      Rate and Rhythm: Normal rate and regular rhythm.   Pulmonary:      Effort: Pulmonary effort is normal.      Breath sounds: Normal breath sounds.   Skin:     General: Skin is warm and dry.   Neurological:      Mental Status: She is alert and oriented to person, place, and time.   Psychiatric:         Mood and Affect: Mood normal.         Behavior: Behavior normal.         Thought Content: Thought content  "normal.         Judgment: Judgment normal.     /78   Pulse 68   Temp 97.3 °F (36.3 °C)   Ht 170.2 cm (67.01\")   Wt 97.1 kg (214 lb)   SpO2 97%   BMI 33.51 kg/m²       CPAP Report  81/90 days of use  Greater than 4-hour use 76.7  90% pressure 17.0  AHI of 2.4  Settings 16-20    The patient continues to use and benefit from CPAP therapy.    ASSESSMENT/PLAN    Diagnoses and all orders for this visit:    1. FREDA (obstructive sleep apnea) (Primary)  -     PAP Therapy    2. Psychophysiological insomnia  Comments:  Trazodone per PCP        Refill supplies x 1 year.  Patient will continue trazodone per PCP direction I will see her back in 1 year or sooner as symptoms warrant.    Signed by  ELVA Benítez    March 25, 2025      CC: Merle Charlton APRN         No ref. provider found      "

## 2025-05-14 PROCEDURE — 87086 URINE CULTURE/COLONY COUNT: CPT | Performed by: FAMILY MEDICINE

## 2025-07-29 ENCOUNTER — TELEPHONE (OUTPATIENT)
Dept: SLEEP MEDICINE | Facility: CLINIC | Age: 60
End: 2025-07-29
Payer: MEDICARE

## 2025-07-29 NOTE — TELEPHONE ENCOUNTER
Provider: LO LAZO    Caller: Colette Hatch    Relationship to Patient: Self    Reason for Call: PATIENT WOULD LIKE TO GET A NEW CPAP MACHINE. SHE WOULD LIKE TO KNOW IF SHE NEEDS A ORDER. PLEASE ADVISE

## 2025-08-18 ENCOUNTER — TELEPHONE (OUTPATIENT)
Dept: ADMINISTRATIVE | Facility: OTHER | Age: 60
End: 2025-08-18
Payer: MEDICARE